# Patient Record
Sex: MALE | Race: WHITE | NOT HISPANIC OR LATINO | Employment: OTHER | URBAN - METROPOLITAN AREA
[De-identification: names, ages, dates, MRNs, and addresses within clinical notes are randomized per-mention and may not be internally consistent; named-entity substitution may affect disease eponyms.]

---

## 2018-02-12 ENCOUNTER — PREP FOR SURGERY (OUTPATIENT)
Dept: OTHER | Facility: HOSPITAL | Age: 78
End: 2018-02-12

## 2018-02-12 DIAGNOSIS — Z86.010 HX OF ADENOMATOUS COLONIC POLYPS: Primary | ICD-10-CM

## 2018-02-15 PROBLEM — Z86.010 HX OF ADENOMATOUS COLONIC POLYPS: Status: ACTIVE | Noted: 2018-02-15

## 2018-02-26 ENCOUNTER — OUTSIDE FACILITY SERVICE (OUTPATIENT)
Dept: GASTROENTEROLOGY | Facility: CLINIC | Age: 78
End: 2018-02-26

## 2018-02-26 PROCEDURE — 45385 COLONOSCOPY W/LESION REMOVAL: CPT | Performed by: INTERNAL MEDICINE

## 2018-02-26 PROCEDURE — 45381 COLONOSCOPY SUBMUCOUS NJX: CPT | Performed by: INTERNAL MEDICINE

## 2018-03-08 ENCOUNTER — TELEPHONE (OUTPATIENT)
Dept: GASTROENTEROLOGY | Facility: CLINIC | Age: 78
End: 2018-03-08

## 2018-03-08 NOTE — TELEPHONE ENCOUNTER
----- Message from Rob POSADA MD sent at 3/8/2018 12:15 PM EST -----  Regarding: Biopsy results  I called results and left on his answering machine, would try to reach him in the event he has any questions regarding this.  Essentially told him we would talk about follow-up examination in 3 years.  ----- Message -----     From: Interface, Scans Incoming     Sent: 3/7/2018  11:40 AM       To: Rob POSADA MD

## 2018-03-16 NOTE — TELEPHONE ENCOUNTER
Called pt and pt reports that he did speak with Dr Call and he did understand his results.  Advised pt we have have put his c/s in the reminder system for 02/26/2021.  Pt verb understanding.

## 2019-06-11 PROBLEM — E78.5 HYPERLIPIDEMIA LDL GOAL <100: Status: ACTIVE | Noted: 2019-06-11

## 2019-06-11 PROBLEM — Z92.89 H/O ECHOCARDIOGRAM: Status: ACTIVE | Noted: 2019-06-11

## 2019-06-11 PROBLEM — E11.9 DIABETES MELLITUS (HCC): Status: ACTIVE | Noted: 2019-06-11

## 2019-06-11 PROBLEM — Z92.89 HISTORY OF DIAGNOSTIC ULTRASOUND: Status: ACTIVE | Noted: 2019-06-11

## 2019-06-11 PROBLEM — Z95.1 S/P CABG (CORONARY ARTERY BYPASS GRAFT): Status: ACTIVE | Noted: 2019-06-11

## 2019-06-11 PROBLEM — I25.10 CAD (CORONARY ARTERY DISEASE): Status: ACTIVE | Noted: 2019-06-11

## 2019-06-11 PROBLEM — Z95.5 H/O HEART ARTERY STENT: Status: ACTIVE | Noted: 2019-06-11

## 2019-06-11 PROBLEM — Z98.890 HISTORY OF CARDIAC CATH: Status: ACTIVE | Noted: 2019-06-11

## 2019-06-11 PROBLEM — I10 ESSENTIAL HYPERTENSION: Status: ACTIVE | Noted: 2019-06-11

## 2019-06-12 ENCOUNTER — OFFICE VISIT (OUTPATIENT)
Dept: CARDIOLOGY | Facility: CLINIC | Age: 79
End: 2019-06-12

## 2019-06-12 VITALS
BODY MASS INDEX: 28.04 KG/M2 | HEART RATE: 60 BPM | HEIGHT: 72 IN | SYSTOLIC BLOOD PRESSURE: 110 MMHG | DIASTOLIC BLOOD PRESSURE: 60 MMHG | WEIGHT: 207 LBS

## 2019-06-12 DIAGNOSIS — Z95.1 S/P CABG (CORONARY ARTERY BYPASS GRAFT): ICD-10-CM

## 2019-06-12 DIAGNOSIS — I25.10 CORONARY ARTERY DISEASE INVOLVING NATIVE CORONARY ARTERY OF NATIVE HEART WITHOUT ANGINA PECTORIS: Primary | ICD-10-CM

## 2019-06-12 DIAGNOSIS — I10 ESSENTIAL HYPERTENSION: ICD-10-CM

## 2019-06-12 DIAGNOSIS — Z95.5 H/O HEART ARTERY STENT: ICD-10-CM

## 2019-06-12 DIAGNOSIS — E10.9 TYPE 1 DIABETES MELLITUS WITHOUT COMPLICATION (HCC): ICD-10-CM

## 2019-06-12 DIAGNOSIS — E78.5 HYPERLIPIDEMIA LDL GOAL <100: ICD-10-CM

## 2019-06-12 PROCEDURE — 99214 OFFICE O/P EST MOD 30 MIN: CPT | Performed by: INTERNAL MEDICINE

## 2019-06-12 NOTE — PROGRESS NOTES
[facility]        Subjective:     Encounter Date:06/12/2019      Patient ID: Izabela Martinez is a 78 y.o. male.      HPI: I saw Izabela Martinez today for continued cardiac care of his cardiovascular disease.  He is a pleasant and active 78-year-old male.  He has a long history of coronary heart disease with a initial coronary artery bypass surgery 1997 and redo coronary artery bypass surgery 4/18/2017 with a saphenous vein graft to the LAD, PDA branch of the right coronary artery, and a sequential graft to OM 1 and OM 2 branchs of the circumflex.  While visiting in [place] August 25, 2018 he developed symptoms of angina with elevated troponin to 1.9.  Coronary angiography revealed thrombus in an old saphenous vein graft to the right coronary artery which patient demonstrated patent stent but there was ulcerated plaque with thrombus in the distal region.  He demonstrated patent grafts to the LAD, diagonal sequential graft to OM1 OM 2 and to the right coronary artery he was placed on anticoagulation with Xarelto in addition to aspirin 81 mg daily.  He remains free of angina.  He does not experience any progressive dyspnea on exertion and is without orthopnea or PND no lower extremity edema.  He denies syncope near syncope or palpitations.  His blood pressure is well controlled.  He remains on atorvastatin 20 mg a day and his lipids are monitored by Dr. Ze viveros.      The following portions of the patient's history were reviewed and updated as appropriate: allergies, current medications, past family history, past medical history, past social history, past surgical history and problem list.    Problem List:  Patient Active Problem List   Diagnosis   • Hx of adenomatous colonic polyps   • Diabetes mellitus (CMS/[facility])   • Hyperlipidemia LDL goal <100   • Essential hypertension   • CAD (coronary artery disease)   • S/P CABG (coronary artery bypass graft)   • H/O heart artery stent   • History of  cardiac cath   • H/O echocardiogram   • History of diagnostic ultrasound       Past Medical History:  Past Medical History:   Diagnosis Date   • CAD (coronary artery disease) 6/11/2019   • Diabetes mellitus (CMS/[facility]) 6/11/2019   • Essential hypertension 6/11/2019   • H/O echocardiogram 6/11/2019    3/21/14 - Mild concentric LVG.  EF 55-60%.  Mild MR.  Moderate [place].     • H/O heart artery stent 6/11/2019    3/20/14 - PCI to the SVG of the RCA with a RESOLUTE drug eluting stent   • History of cardiac cath 6/11/2019 4/17/17 - Severe three vessel obstructive CAD.  Severely diseased vein grafts (SVG to ?OM, and SVG to RCA) that are the source of the patient's unstable angina..  EF 65%.   • History of diagnostic ultrasound 6/11/2019 4/18/17 - CAROTID ULTRASOUND - No evidence of significant carotid stenosis.  NASCET criteria was utilized.   • Hyperlipidemia LDL goal <100 6/11/2019   • S/P CABG (coronary artery bypass graft) 6/11/2019    Redo CABG 4/2017       Past Surgical History:  No past surgical history on file.    Social History:  Social History     Socioeconomic History   • Marital status:      Spouse name: Not on file   • Number of children: Not on file   • Years of education: Not on file   • Highest education level: Not on file       Allergies:  Allergies   Allergen Reactions   • Demerol [Meperidine] Dizziness   • Toprol Xl [Metoprolol Tartrate] Unknown (See Comments)     unknown         ROS:  Review of Systems   Constitution: Negative for weakness and malaise/fatigue.   HENT: Negative for hearing loss and nosebleeds.    Eyes: Negative for double vision and visual disturbance.   Cardiovascular: Negative for chest pain, claudication, dyspnea on exertion, near-syncope, orthopnea, palpitations, paroxysmal nocturnal dyspnea and syncope.   Respiratory: Negative for cough, shortness of breath, sleep disturbances due to breathing, snoring, sputum production and wheezing.    Endocrine: Negative for cold  "intolerance, heat intolerance, polydipsia and polyuria.   Hematologic/Lymphatic: Negative for adenopathy and bleeding problem. Does not bruise/bleed easily.   Skin: Negative for flushing and itching.   Musculoskeletal: Negative for back pain, falls, joint pain, joint swelling, muscle weakness and neck pain.   Gastrointestinal: Negative for abdominal pain, dysphagia, heartburn, nausea and vomiting.   Genitourinary: Negative for dysuria and frequency.   Neurological: Negative for disturbances in coordination, dizziness, light-headedness, loss of balance and numbness.   Psychiatric/Behavioral: Negative for altered mental status and memory loss. The patient is not nervous/anxious.    Allergic/Immunologic: Negative for hives and persistent infections.          Objective:         /60   Pulse 60   Ht 182.9 cm (72\")   Wt 93.9 kg (207 lb)   BMI 28.07 kg/m²     Physical Exam   Cardiovascular: Exam reveals gallop and S4.       In-Office Procedure(s):  Procedures    ASCVD RIsk Score::  The ASCVD Risk score (Juan SIMPSON Jr., et al., 2013) failed to calculate for the following reasons:    Cannot find a previous HDL lab    Cannot find a previous total cholesterol lab    The smoking status is invalid        Assessment/Plan:         1. Coronary artery disease involving native coronary artery of native heart without angina pectoris  Stable free of angina on current medical regimen    2. S/P CABG (coronary artery bypass graft)  Stable    3. H/O heart artery stent  Stable    4. Essential hypertension  Well-controlled on current medical regimen    5. Hyperlipidemia LDL goal <100  On statin therapy and well-controlled no significant side effects    6. Type 1 diabetes mellitus without complication (CMS/HCC)  Controlled with diet activity level and oral agent I feel Mr. Martinez is stable from the cardiovascular standpoint.  He is vigilant in regards to his risk modification and following his medical regimen.  I will see him in " follow-up in 6 months sooner if needed.           Yosef Rose MD  06/12/19  .

## 2019-10-14 RX ORDER — RAMIPRIL 2.5 MG/1
2.5 CAPSULE ORAL DAILY
Qty: 90 CAPSULE | Refills: 1 | Status: SHIPPED | OUTPATIENT
Start: 2019-10-14 | End: 2020-06-03

## 2019-10-14 RX ORDER — ATORVASTATIN CALCIUM 20 MG/1
20 TABLET, FILM COATED ORAL DAILY
Qty: 90 TABLET | Refills: 1 | Status: SHIPPED | OUTPATIENT
Start: 2019-10-14 | End: 2020-04-14 | Stop reason: SDUPTHER

## 2019-10-16 ENCOUNTER — TELEPHONE (OUTPATIENT)
Dept: CARDIOLOGY | Facility: CLINIC | Age: 79
End: 2019-10-16

## 2019-10-16 NOTE — TELEPHONE ENCOUNTER
Mr. Martinez wife called stating that he needs a refill on his Gemfibrozil 600mg tablets. He takes 2 per day, and gets 90 day refills. She states that [facility] has been trying to

## 2019-10-21 DIAGNOSIS — E78.5 HYPERLIPIDEMIA LDL GOAL <100: Primary | ICD-10-CM

## 2019-10-21 RX ORDER — GEMFIBROZIL 600 MG/1
600 TABLET, FILM COATED ORAL
Qty: 180 TABLET | Refills: 1 | Status: SHIPPED | OUTPATIENT
Start: 2019-10-21 | End: 2020-04-14 | Stop reason: SDUPTHER

## 2019-10-22 ENCOUNTER — TELEPHONE (OUTPATIENT)
Dept: CARDIOLOGY | Facility: CLINIC | Age: 79
End: 2019-10-22

## 2019-10-22 NOTE — TELEPHONE ENCOUNTER
Called back and spoke with Nir. He did not see ay reason for the phone call. I sent the rx yesterday and they received it. Ayala

## 2019-10-22 NOTE — TELEPHONE ENCOUNTER
Jil with [facility] is requesting a call regarding the patients medication. Patient needing a refill on Gemfibrozil 600 mg 1 tab 2 x's daily. 90 day supply      Jil [phone]  Ref # [id number]

## 2019-11-05 ENCOUNTER — OFFICE VISIT (OUTPATIENT)
Dept: FAMILY MEDICINE CLINIC | Facility: CLINIC | Age: 79
End: 2019-11-05

## 2019-11-05 VITALS
SYSTOLIC BLOOD PRESSURE: 124 MMHG | WEIGHT: 211 LBS | HEART RATE: 65 BPM | OXYGEN SATURATION: 96 % | TEMPERATURE: 97.4 F | HEIGHT: 72 IN | BODY MASS INDEX: 28.58 KG/M2 | DIASTOLIC BLOOD PRESSURE: 60 MMHG

## 2019-11-05 DIAGNOSIS — E66.3 OVERWEIGHT (BMI 25.0-29.9): ICD-10-CM

## 2019-11-05 DIAGNOSIS — E78.1 ESSENTIAL HYPERTRIGLYCERIDEMIA: ICD-10-CM

## 2019-11-05 DIAGNOSIS — D69.6 THROMBOCYTOPENIA (HCC): ICD-10-CM

## 2019-11-05 DIAGNOSIS — H91.93 HEARING DECREASED, BILATERAL: ICD-10-CM

## 2019-11-05 DIAGNOSIS — I10 ESSENTIAL HYPERTENSION: Primary | ICD-10-CM

## 2019-11-05 DIAGNOSIS — E78.5 HYPERLIPIDEMIA LDL GOAL <100: ICD-10-CM

## 2019-11-05 DIAGNOSIS — E11.9 TYPE 2 DIABETES MELLITUS WITHOUT COMPLICATION, WITHOUT LONG-TERM CURRENT USE OF INSULIN (HCC): ICD-10-CM

## 2019-11-05 DIAGNOSIS — R25.1 TREMOR: ICD-10-CM

## 2019-11-05 DIAGNOSIS — D64.9 ANEMIA, UNSPECIFIED TYPE: ICD-10-CM

## 2019-11-05 PROBLEM — S90.822A BLISTER OF FOOT, LEFT: Status: RESOLVED | Noted: 2019-11-05 | Resolved: 2019-11-05

## 2019-11-05 PROCEDURE — 99214 OFFICE O/P EST MOD 30 MIN: CPT | Performed by: INTERNAL MEDICINE

## 2019-11-05 RX ORDER — SILDENAFIL 100 MG/1
100 TABLET, FILM COATED ORAL
COMMUNITY
Start: 2012-05-02 | End: 2020-06-03

## 2019-11-05 NOTE — PATIENT INSTRUCTIONS

## 2019-11-05 NOTE — PROGRESS NOTES
Subjective   Izabela Martinez is a 79 y.o. male.     Chief Complaint   Patient presents with   • Hypertension       History of Present Illness   Here for follow-up of diabetes.  Patient states to have been compliant with medications.  Blood sugar monitoring - patient states has not been checked.  No episodes of hypoglycemia, nausea, vomiting, new rashes, syncope or other issues.  Denies any difficulties and not on any medications.  Follow-up for cholesterol.  Currently has been feeling well without any myalgias, muscle aches, weakness, numbness, chest pain, short of breath or other issues.  Currently is adherent with medication regimen and denies medication side effects. Is due for lab follow-up.  Follow-up for hypertension.  Currently has been feeling well and asymptomatic without any headaches,vision changes, cough, chest pain, shortness of breath, swelling, focal neurologic deficit, memory loss or syncope.  Has been taking the medications regularly and adherent with the regimen, Denies medication side effects and no significant interval events.  Home blood pressure has been running good.    Hearing issues.  Has been around car racing all of his life.    Has history of JAYCOB but failed treatment and refuses reevaluation.  At night moves a lot, jerks a lot and acts out dreams at night.  Has tremor in the right greater than left hand present for several years and worsening.  States has been present since last heart surgery.  Seems to be some worsening and is noticeable with writing and raising glass to mouth.  Seems to be worse when he is physically tired or after manual working.    The following portions of the patient's history were reviewed and updated as appropriate: allergies, current medications, past family history, past medical history, past social history, past surgical history and problem list.    Past Medical History:   Diagnosis Date   • Allergic cough    • Anemia    • Arteriosclerosis of coronary artery     • Benign enlargement of prostate    • Benign essential hypertension    • Blister of foot, left    • BMI 30.0-30.9,adult    • CAD (coronary artery disease) 6/11/2019   • Cough    • Diabetes mellitus (CMS/HCC) 6/11/2019   • Disorder of skin    • Essential hypertension 6/11/2019    Benign   • Essential hypertriglyceridemia    • H/O echocardiogram 6/11/2019    3/21/14 - Mild concentric LVG.  EF 55-60%.  Mild MR.  Moderate [place].     • H/O heart artery stent 6/11/2019    3/20/14 - PCI to the SVG of the RCA with a RESOLUTE drug eluting stent   • Hammer toe     right second-fourth toes    • History of cancer    • History of cardiac cath 6/11/2019 4/17/17 - Severe three vessel obstructive CAD.  Severely diseased vein grafts (SVG to ?OM, and SVG to RCA) that are the source of the patient's unstable angina..  EF 65%.   • History of diagnostic ultrasound 6/11/2019 4/18/17 - CAROTID ULTRASOUND - No evidence of significant carotid stenosis.  NASCET criteria was utilized.   • History of echocardiogram    • History of nuclear stress test    • Hyperlipidemia LDL goal <100 6/11/2019   • Hypertrophy of prostate     Benign   • Hypocalcemia    • Hypokalemia    • Inability to attain erection    • Medicare annual wellness visit, subsequent    • JAYCOB on CPAP    • Right retinal detachment    • S/P CABG (coronary artery bypass graft) 6/11/2019    Redo CABG 4/2017   • S/P cardiac catheterization    • S/P coronary artery stent placement    • Thrombocytopenia (CMS/HCC)        Past Surgical History:   Procedure Laterality Date   • APPENDECTOMY     • CATARACT EXTRACTION  2012 2011 and 2012   • COLONOSCOPY      polyps- recheck 2014. polyps repeat every 3 years   • CORONARY ARTERY BYPASS GRAFT      4 vessel 4/21/17. 4 vessel- Left internal mammary to LAD, saphenous vein graft to diagonal branch, marginal brance and the PDA branch.    • FOOT SURGERY      right heel   • OTHER SURGICAL HISTORY      Cath stent placement    • RETINAL DETACHMENT  REPAIR         Family History   Problem Relation Age of Onset   • Cancer Mother    • Diabetes Brother    • Heart disease Brother    • Heart disease Maternal Grandmother    • Heart disease Paternal Grandfather        Social History     Socioeconomic History   • Marital status:      Spouse name: Not on file   • Number of children: Not on file   • Years of education: Not on file   • Highest education level: Not on file   Tobacco Use   • Smoking status: Never Smoker   • Smokeless tobacco: Never Used   Substance and Sexual Activity   • Alcohol use: No     Frequency: Never   • Drug use: No   • Sexual activity: Defer       Review of Systems   Constitutional: Negative for activity change, appetite change, fatigue, fever, unexpected weight gain and unexpected weight loss.   HENT: Positive for hearing loss. Negative for nosebleeds, rhinorrhea, trouble swallowing and voice change.    Eyes: Negative for visual disturbance.   Respiratory: Negative for cough, chest tightness, shortness of breath and wheezing.    Cardiovascular: Negative for chest pain, palpitations and leg swelling.   Gastrointestinal: Negative for abdominal pain, blood in stool, constipation, diarrhea, nausea, vomiting, GERD and indigestion.   Genitourinary: Negative for dysuria, frequency and hematuria.   Musculoskeletal: Negative for arthralgias, back pain and myalgias.        Hammer toes and right ankle chronic pain   Skin: Negative for rash and bruise.   Neurological: Positive for tremors. Negative for dizziness, weakness, light-headedness, numbness, headache and memory problem.   Hematological: Negative for adenopathy. Does not bruise/bleed easily.   Psychiatric/Behavioral: Negative for sleep disturbance and depressed mood. The patient is not nervous/anxious.        Objective   Vitals:    11/05/19 0831   BP: 124/60   Pulse: 65   Temp: 97.4 °F (36.3 °C)   SpO2: 96%     Body mass index is 28.62 kg/m².  Physical Exam   Constitutional: He is oriented to  person, place, and time. He appears well-developed and well-nourished. No distress.   HENT:   Head: Normocephalic and atraumatic.   Right Ear: External ear normal.   Left Ear: External ear normal.   Nose: Nose normal.   Mouth/Throat: Oropharynx is clear and moist.   Hearing loss bilateral on exam   Eyes: Conjunctivae and EOM are normal. Pupils are equal, round, and reactive to light.   Neck: Normal range of motion. Neck supple. No tracheal deviation present. No thyromegaly present.   Cardiovascular: Normal rate, regular rhythm, normal heart sounds and intact distal pulses. Exam reveals no gallop and no friction rub.   No murmur heard.  Pulmonary/Chest: Effort normal and breath sounds normal. No respiratory distress.   Abdominal: Soft. Bowel sounds are normal. He exhibits no mass. There is no tenderness. There is no guarding.   Musculoskeletal: Normal range of motion. He exhibits no edema.   Right hammer toes   Lymphadenopathy:     He has no cervical adenopathy.   Neurological: He is alert and oriented to person, place, and time. He displays normal reflexes. He exhibits normal muscle tone.   Mild tremor in right greater than left had worse with intention and not at rest.   Skin: Skin is warm and dry. Capillary refill takes less than 2 seconds. No rash noted. He is not diaphoretic.   Psychiatric: He has a normal mood and affect. His behavior is normal. Judgment and thought content normal.   Nursing note and vitals reviewed.      No results found for this or any previous visit (from the past 2016 hour(s)).  Assessment/Plan   Izabela was seen today for hypertension.    Diagnoses and all orders for this visit:    Essential hypertension  -     Comprehensive Metabolic Panel  -     Lipid Panel    Hyperlipidemia LDL goal <100  -     Comprehensive Metabolic Panel  -     Lipid Panel    Type 2 diabetes mellitus without complication, without long-term current use of insulin (CMS/[facility])  -     Comprehensive Metabolic Panel  -      Lipid Panel  -     Hemoglobin A1c  -     Microalbumin / Creatinine Urine Ratio - Urine, Clean Catch    Anemia, unspecified type  -     CBC & Differential    Thrombocytopenia (CMS/HCC)  -     CBC & Differential    Essential hypertriglyceridemia  -     Comprehensive Metabolic Panel  -     Lipid Panel    Tremor    Hearing decreased, bilateral  -     Ambulatory Referral to Audiology    Overweight (BMI 25.0-29.9)    Continue the current medications.  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  Check the labs as ordered.  Not sure what his blood sugars have been doing so will await the labs and encouraged to follow the diet and exercise as best as possible.  Schedule follow up with audiology for the hearing loss evident.  Follow up with cardiology as scheduled.

## 2019-11-06 LAB
ALBUMIN SERPL-MCNC: 4.7 G/DL (ref 3.5–5.2)
ALBUMIN/CREAT UR: 9.9 MG/G CREAT (ref 0–30)
ALBUMIN/GLOB SERPL: 2.5 G/DL
ALP SERPL-CCNC: 96 U/L (ref 39–117)
ALT SERPL-CCNC: 15 U/L (ref 1–41)
AST SERPL-CCNC: 18 U/L (ref 1–40)
BASOPHILS # BLD AUTO: 0.03 10*3/MM3 (ref 0–0.2)
BASOPHILS NFR BLD AUTO: 0.6 % (ref 0–1.5)
BILIRUB SERPL-MCNC: 0.5 MG/DL (ref 0.2–1.2)
BUN SERPL-MCNC: 24 MG/DL (ref 8–23)
BUN/CREAT SERPL: 25.5 (ref 7–25)
CALCIUM SERPL-MCNC: 9.3 MG/DL (ref 8.6–10.5)
CHLORIDE SERPL-SCNC: 104 MMOL/L (ref 98–107)
CHOLEST SERPL-MCNC: 127 MG/DL (ref 0–200)
CO2 SERPL-SCNC: 25.7 MMOL/L (ref 22–29)
CREAT SERPL-MCNC: 0.94 MG/DL (ref 0.76–1.27)
CREAT UR-MCNC: 114.3 MG/DL
EOSINOPHIL # BLD AUTO: 0.22 10*3/MM3 (ref 0–0.4)
EOSINOPHIL NFR BLD AUTO: 4.1 % (ref 0.3–6.2)
ERYTHROCYTE [DISTWIDTH] IN BLOOD BY AUTOMATED COUNT: 11.9 % (ref 12.3–15.4)
GLOBULIN SER CALC-MCNC: 1.9 GM/DL
GLUCOSE SERPL-MCNC: 119 MG/DL (ref 65–99)
HBA1C MFR BLD: 7.2 % (ref 4.8–5.6)
HCT VFR BLD AUTO: 44 % (ref 37.5–51)
HDLC SERPL-MCNC: 39 MG/DL (ref 40–60)
HGB BLD-MCNC: 15.2 G/DL (ref 13–17.7)
IMM GRANULOCYTES # BLD AUTO: 0.02 10*3/MM3 (ref 0–0.05)
IMM GRANULOCYTES NFR BLD AUTO: 0.4 % (ref 0–0.5)
LDLC SERPL CALC-MCNC: 71 MG/DL (ref 0–100)
LYMPHOCYTES # BLD AUTO: 2.27 10*3/MM3 (ref 0.7–3.1)
LYMPHOCYTES NFR BLD AUTO: 41.9 % (ref 19.6–45.3)
MCH RBC QN AUTO: 33 PG (ref 26.6–33)
MCHC RBC AUTO-ENTMCNC: 34.5 G/DL (ref 31.5–35.7)
MCV RBC AUTO: 95.7 FL (ref 79–97)
MICROALBUMIN UR-MCNC: 11.3 UG/ML
MONOCYTES # BLD AUTO: 0.58 10*3/MM3 (ref 0.1–0.9)
MONOCYTES NFR BLD AUTO: 10.7 % (ref 5–12)
NEUTROPHILS # BLD AUTO: 2.3 10*3/MM3 (ref 1.7–7)
NEUTROPHILS NFR BLD AUTO: 42.3 % (ref 42.7–76)
NRBC BLD AUTO-RTO: 0 /100 WBC (ref 0–0.2)
PLATELET # BLD AUTO: 197 10*3/MM3 (ref 140–450)
POTASSIUM SERPL-SCNC: 4.5 MMOL/L (ref 3.5–5.2)
PROT SERPL-MCNC: 6.6 G/DL (ref 6–8.5)
RBC # BLD AUTO: 4.6 10*6/MM3 (ref 4.14–5.8)
SODIUM SERPL-SCNC: 142 MMOL/L (ref 136–145)
TRIGL SERPL-MCNC: 86 MG/DL (ref 0–150)
VLDLC SERPL CALC-MCNC: 17.2 MG/DL
WBC # BLD AUTO: 5.42 10*3/MM3 (ref 3.4–10.8)

## 2019-11-20 ENCOUNTER — OFFICE VISIT (OUTPATIENT)
Dept: CARDIOLOGY | Facility: CLINIC | Age: 79
End: 2019-11-20

## 2019-11-20 VITALS
BODY MASS INDEX: 28.71 KG/M2 | HEART RATE: 68 BPM | SYSTOLIC BLOOD PRESSURE: 100 MMHG | DIASTOLIC BLOOD PRESSURE: 66 MMHG | WEIGHT: 212 LBS | HEIGHT: 72 IN

## 2019-11-20 DIAGNOSIS — I10 ESSENTIAL HYPERTENSION: ICD-10-CM

## 2019-11-20 DIAGNOSIS — Z95.1 S/P CABG (CORONARY ARTERY BYPASS GRAFT): ICD-10-CM

## 2019-11-20 DIAGNOSIS — I25.10 CORONARY ARTERY DISEASE INVOLVING NATIVE CORONARY ARTERY OF NATIVE HEART WITHOUT ANGINA PECTORIS: ICD-10-CM

## 2019-11-20 DIAGNOSIS — E78.1 ESSENTIAL HYPERTRIGLYCERIDEMIA: ICD-10-CM

## 2019-11-20 DIAGNOSIS — E11.9 TYPE 2 DIABETES MELLITUS WITHOUT COMPLICATION, WITHOUT LONG-TERM CURRENT USE OF INSULIN (HCC): ICD-10-CM

## 2019-11-20 DIAGNOSIS — E78.5 HYPERLIPIDEMIA LDL GOAL <100: ICD-10-CM

## 2019-11-20 DIAGNOSIS — Z95.5 H/O HEART ARTERY STENT: ICD-10-CM

## 2019-11-20 DIAGNOSIS — R42 ORTHOSTATIC DIZZINESS: Primary | ICD-10-CM

## 2019-11-20 PROCEDURE — 99214 OFFICE O/P EST MOD 30 MIN: CPT | Performed by: INTERNAL MEDICINE

## 2019-11-20 NOTE — PROGRESS NOTES
[facility]        Subjective:     Encounter Date:11/20/2019      Patient ID: Izabela Martinez is a 79 y.o. male.      HPI: I saw Izabela Martinez today for cardiovascular care.  Mr. Martinez is a pleasant and active 79-year-old.  He reports some mild orthostatic dizziness.  He has known coronary artery disease and underwent redo coronary artery bypass surgery in 2017 with a saphenous vein graft to the LAD, PDA, sequential graft OM1 and OM 2 of the circumflex.  August 25, 2018 he developed symptoms of angina and was hospitalized while visiting in [place].  His opponent elevated 1.9.  He underwent coronary angiography in his redo bypass grafts were patent.  It was noted that old saphenous vein graft to the right coronary artery demonstrated ulcerated plaque and thrombus.  Previously placed stent in this graft was patent.  Anticoagulation with Xarelto was added to his medical regimen and remains stable.    Mr. Martinez has baseline dyspnea on exertion but this is not significantly changed.  He is free of orthopnea or PND.  He denies gladis syncope or palpitations.    Fasting lipids obtained 11/5/2019 revealed triglycerides 86, HDL 39, LDL 71.      The following portions of the patient's history were reviewed and updated as appropriate: allergies, current medications, past family history, past medical history, past social history, past surgical history and problem list.    Problem List:  Patient Active Problem List   Diagnosis   • Hx of adenomatous colonic polyps   • Diabetes mellitus (CMS/HCC)   • Hyperlipidemia LDL goal <100   • Essential hypertension   • CAD (coronary artery disease)   • S/P CABG (coronary artery bypass graft)   • H/O heart artery stent   • History of cardiac cath   • H/O echocardiogram   • History of diagnostic ultrasound   • Family history of early CAD   • Erectile dysfunction   • Anemia   • Thrombocytopenia (CMS/HCC)   • Essential hypertriglyceridemia   • Tremor   • Hearing decreased,  bilateral   • Orthostatic dizziness       Past Medical History:  Past Medical History:   Diagnosis Date   • Allergic cough    • Anemia    • Arteriosclerosis of coronary artery    • Benign enlargement of prostate    • Benign essential hypertension    • Blister of foot, left    • BMI 30.0-30.9,adult    • CAD (coronary artery disease) 6/11/2019   • Cough    • Diabetes mellitus (CMS/HCC) 6/11/2019   • Disorder of skin    • Essential hypertension 6/11/2019    Benign   • Essential hypertriglyceridemia    • H/O echocardiogram 6/11/2019    3/21/14 - Mild concentric LVG.  EF 55-60%.  Mild MR.  Moderate CO.     • H/O heart artery stent 6/11/2019    3/20/14 - PCI to the SVG of the RCA with a RESOLUTE drug eluting stent   • Hammer toe     right second-fourth toes    • History of cancer    • History of cardiac cath 6/11/2019 4/17/17 - Severe three vessel obstructive CAD.  Severely diseased vein grafts (SVG to ?OM, and SVG to RCA) that are the source of the patient's unstable angina..  EF 65%.   • History of diagnostic ultrasound 6/11/2019 4/18/17 - CAROTID ULTRASOUND - No evidence of significant carotid stenosis.  NASCET criteria was utilized.   • History of echocardiogram    • History of nuclear stress test    • Hyperlipidemia LDL goal <100 6/11/2019   • Hypertrophy of prostate     Benign   • Hypocalcemia    • Hypokalemia    • Inability to attain erection    • Medicare annual wellness visit, subsequent    • JAYCOB on CPAP    • Right retinal detachment    • S/P CABG (coronary artery bypass graft) 6/11/2019    Redo CABG 4/2017   • S/P cardiac catheterization    • S/P coronary artery stent placement    • Thrombocytopenia (CMS/HCC)        Past Surgical History:  Past Surgical History:   Procedure Laterality Date   • APPENDECTOMY     • CATARACT EXTRACTION  2012 2011 and 2012   • COLONOSCOPY      polyps- recheck 2014. polyps repeat every 3 years   • CORONARY ARTERY BYPASS GRAFT      4 vessel 4/21/17. 4 vessel- Left internal  mammary to LAD, saphenous vein graft to diagonal branch, marginal brance and the PDA branch.    • FOOT SURGERY      right heel   • OTHER SURGICAL HISTORY      Cath stent placement    • RETINAL DETACHMENT REPAIR         Social History:  Social History     Socioeconomic History   • Marital status:      Spouse name: Not on file   • Number of children: Not on file   • Years of education: Not on file   • Highest education level: Not on file   Tobacco Use   • Smoking status: Never Smoker   • Smokeless tobacco: Never Used   Substance and Sexual Activity   • Alcohol use: No     Frequency: Never   • Drug use: No   • Sexual activity: Defer       Allergies:  Allergies   Allergen Reactions   • Demerol [Meperidine] Dizziness         ROS:  Review of Systems   Constitution: Negative for weakness and malaise/fatigue.   HENT: Negative for hearing loss and nosebleeds.    Eyes: Negative for double vision and visual disturbance.   Cardiovascular: Negative for chest pain, claudication, dyspnea on exertion, near-syncope, orthopnea, palpitations, paroxysmal nocturnal dyspnea and syncope.   Respiratory: Negative for cough, shortness of breath, sleep disturbances due to breathing, snoring, sputum production and wheezing.    Endocrine: Negative for cold intolerance, heat intolerance, polydipsia and polyuria.   Hematologic/Lymphatic: Negative for adenopathy and bleeding problem. Does not bruise/bleed easily.   Skin: Negative for flushing and itching.   Musculoskeletal: Negative for back pain, falls, joint pain, joint swelling, muscle weakness and neck pain.   Gastrointestinal: Negative for abdominal pain, dysphagia, heartburn, nausea and vomiting.   Genitourinary: Negative for dysuria and frequency.   Neurological: Positive for dizziness. Negative for disturbances in coordination, light-headedness, loss of balance and numbness.   Psychiatric/Behavioral: Negative for altered mental status and memory loss. The patient is not  "nervous/anxious.    Allergic/Immunologic: Negative for hives and persistent infections.          Objective:         /66   Pulse 68   Ht 182.9 cm (72\")   Wt 96.2 kg (212 lb)   BMI 28.75 kg/m²     Physical Exam   Constitutional: He is oriented to person, place, and time. He appears well-developed and well-nourished.   HENT:   Head: Normocephalic and atraumatic.   Eyes: Conjunctivae and EOM are normal. Pupils are equal, round, and reactive to light.   Neck: Neck supple. No thyromegaly present.   Cardiovascular: Normal rate, regular rhythm, S1 normal, S2 normal and intact distal pulses. PMI is not displaced. Exam reveals gallop and S4. Exam reveals no S3, no distant heart sounds, no friction rub, no midsystolic click and no opening snap.   No murmur heard.  Pulses:       Carotid pulses are 2+ on the right side, and 2+ on the left side.       Radial pulses are 2+ on the right side, and 2+ on the left side.        Femoral pulses are 2+ on the right side, and 2+ on the left side.       Dorsalis pedis pulses are 2+ on the right side, and 2+ on the left side.        Posterior tibial pulses are 2+ on the right side, and 2+ on the left side.   Pulmonary/Chest: Effort normal and breath sounds normal. No respiratory distress. He has no wheezes. He has no rales.   Abdominal: Soft. Bowel sounds are normal. He exhibits no distension and no mass. There is no tenderness.   Musculoskeletal: Normal range of motion. He exhibits no edema.   Neurological: He is alert and oriented to person, place, and time.   Skin: Skin is warm and dry. No erythema.   Psychiatric: He has a normal mood and affect.       In-Office Procedure(s):  Procedures    ASCVD RIsk Score::  The ASCVD Risk score ([place] TATIANA Jr., et al., 2013) failed to calculate for the following reasons:    The 2013 ASCVD risk score is only valid for ages 40 to 79        Assessment/Plan:         1. Orthostatic dizziness  Moderately symptomatic    2. Coronary artery disease " involving native coronary artery of native heart without angina pectoris  Stable    3. S/P CABG (coronary artery bypass graft)  Stable    4. H/O heart artery stent  Stable    5. Essential hypertriglyceridemia  Continue low-cholesterol low saturated fat diet  and gemfibrozil 600 mg daily    6. Essential hypertension  Relative hypotension    7. Hyperlipidemia LDL goal <100  Continue low-cholesterol low saturated fat diet and atorvastatin 20 mg daily    8. Type 2 diabetes mellitus without complication, without long-term current use of insulin (CMS/[facility])    Mr. Martinez is free of .  He does report orthostatic dizziness his blood pressure is marginal at 100/66.  We will discontinue ramipril.  Of encouraged him to liberalize his salt in his diet.  He question whether his Xarelto anticoagulation can be discontinued.  I will obtain a visual copy of his coronary angiogram while in [place] from August 25, 2018 for review.  He will continue Xarelto and aspirin           Yosef Rose MD  11/20/19  .

## 2019-11-21 RX ORDER — NITROGLYCERIN 0.4 MG/1
0.4 TABLET SUBLINGUAL
Qty: 25 TABLET | Refills: 3 | Status: SHIPPED | OUTPATIENT
Start: 2019-11-21 | End: 2021-05-05 | Stop reason: SDUPTHER

## 2020-04-14 ENCOUNTER — TELEPHONE (OUTPATIENT)
Dept: CARDIOLOGY | Facility: CLINIC | Age: 80
End: 2020-04-14

## 2020-04-14 DIAGNOSIS — E78.5 HYPERLIPIDEMIA LDL GOAL <100: ICD-10-CM

## 2020-04-14 RX ORDER — GEMFIBROZIL 600 MG/1
600 TABLET, FILM COATED ORAL
Qty: 180 TABLET | Refills: 1 | Status: SHIPPED | OUTPATIENT
Start: 2020-04-14 | End: 2020-06-03 | Stop reason: SDUPTHER

## 2020-04-14 RX ORDER — ATORVASTATIN CALCIUM 20 MG/1
20 TABLET, FILM COATED ORAL DAILY
Qty: 90 TABLET | Refills: 1 | Status: SHIPPED | OUTPATIENT
Start: 2020-04-14 | End: 2020-06-03 | Stop reason: SDUPTHER

## 2020-04-14 NOTE — TELEPHONE ENCOUNTER
----- Message from Tanner Aldana DO sent at 8/6/2019  7:06 PM CDT -----  Labs revealed negative hepatitis C screening, appropriate cholesterol levels. Initial thyroid screen is just a little bit out of range, but the actual amount of thyroid hormone in the body is normal.  Please let patient know.     Thank you!  Tanner Aldana DO       Done. [place]

## 2020-04-14 NOTE — TELEPHONE ENCOUNTER
DR Navas PT    PT has new prescription Ins needs new order sent to Mail order.    Gemfibrozil 600 mg BID for 90 days  Atorvastatin 20 mg 1 tab daily for 90 days.    Sent to [facility].

## 2020-04-15 DIAGNOSIS — E78.5 HYPERLIPIDEMIA LDL GOAL <100: ICD-10-CM

## 2020-06-03 ENCOUNTER — TELEMEDICINE (OUTPATIENT)
Dept: CARDIOLOGY | Facility: CLINIC | Age: 80
End: 2020-06-03

## 2020-06-03 VITALS
SYSTOLIC BLOOD PRESSURE: 136 MMHG | BODY MASS INDEX: 27.01 KG/M2 | WEIGHT: 199.4 LBS | DIASTOLIC BLOOD PRESSURE: 76 MMHG | HEIGHT: 72 IN

## 2020-06-03 DIAGNOSIS — I10 ESSENTIAL HYPERTENSION: ICD-10-CM

## 2020-06-03 DIAGNOSIS — E78.5 HYPERLIPIDEMIA LDL GOAL <100: ICD-10-CM

## 2020-06-03 DIAGNOSIS — I25.10 CORONARY ARTERY DISEASE INVOLVING NATIVE CORONARY ARTERY OF NATIVE HEART WITHOUT ANGINA PECTORIS: Primary | ICD-10-CM

## 2020-06-03 DIAGNOSIS — E11.9 TYPE 2 DIABETES MELLITUS WITHOUT COMPLICATION, WITHOUT LONG-TERM CURRENT USE OF INSULIN (HCC): ICD-10-CM

## 2020-06-03 DIAGNOSIS — Z95.1 S/P CABG (CORONARY ARTERY BYPASS GRAFT): ICD-10-CM

## 2020-06-03 PROCEDURE — 99214 OFFICE O/P EST MOD 30 MIN: CPT | Performed by: INTERNAL MEDICINE

## 2020-06-03 RX ORDER — ATORVASTATIN CALCIUM 20 MG/1
20 TABLET, FILM COATED ORAL DAILY
Qty: 90 TABLET | Refills: 3 | Status: SHIPPED | OUTPATIENT
Start: 2020-06-03 | End: 2020-10-07 | Stop reason: SDUPTHER

## 2020-06-03 RX ORDER — GEMFIBROZIL 600 MG/1
600 TABLET, FILM COATED ORAL
Qty: 180 TABLET | Refills: 3 | Status: SHIPPED | OUTPATIENT
Start: 2020-06-03 | End: 2020-10-07 | Stop reason: SDUPTHER

## 2020-06-03 NOTE — PROGRESS NOTES
[facility]    You have chosen to receive care through a telehealth visit.  Do you consent to use a video/audio connection for your medical care today? Yes    Subjective:     Encounter Date:06/03/2020      Patient ID: Izabela Martinez is a 79 y.o. male.      HPI: Zeenat Martinez agreed to a video visit secondary to the coronavirus pandemic.  He is a pleasant 79-year-old male who is observing the CDC guidelines for social distancing.  He denies any fever cough or shortness of breath.  He does not report any change in his sense of smell or taste.  Mr. Martinez has a history of coronary heart disease and is undergone redo coronary artery bypass surgery.  He remains active and free of angina.  He has his baseline dyspnea on exertion which is not progressive.  He is free of orthopnea or PND no lower extremity edema.  He previously reported orthostatic dizziness I discontinued his ramipril and his dizziness has resolved.  He denies any gladis syncope or palpitations.  He continues to tolerate his current medical regimen well including his anticoagulation.  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      The following portions of the patient's history were reviewed and updated as appropriate: allergies, current medications, past family history, past medical history, past social history, past surgical history and problem list.    Problem List:  Patient Active Problem List   Diagnosis   • Hx of adenomatous colonic polyps   • Diabetes mellitus (CMS/HCC)   • Hyperlipidemia LDL goal <100   • Essential hypertension   • CAD (coronary artery disease)   • S/P CABG (coronary artery bypass graft)   • H/O heart artery stent   • History of cardiac cath   • H/O echocardiogram   • History of diagnostic ultrasound   • Family history of early CAD   • Erectile dysfunction   • Anemia   • Thrombocytopenia (CMS/HCC)   • Essential hypertriglyceridemia   • Tremor   •  Hearing decreased, bilateral   • Orthostatic dizziness       Past Medical History:  Past Medical History:   Diagnosis Date   • Allergic cough    • Anemia    • Arteriosclerosis of coronary artery    • Benign enlargement of prostate    • Benign essential hypertension    • Blister of foot, left    • BMI 30.0-30.9,adult    • CAD (coronary artery disease) 6/11/2019   • Cough    • Diabetes mellitus (CMS/HCC) 6/11/2019   • Disorder of skin    • Essential hypertension 6/11/2019    Benign   • Essential hypertriglyceridemia    • H/O echocardiogram 6/11/2019    3/21/14 - Mild concentric LVG.  EF 55-60%.  Mild MR.  Moderate KS.     • H/O heart artery stent 6/11/2019    3/20/14 - PCI to the SVG of the RCA with a RESOLUTE drug eluting stent   • Hammer toe     right second-fourth toes    • History of cancer    • History of cardiac cath 6/11/2019 4/17/17 - Severe three vessel obstructive CAD.  Severely diseased vein grafts (SVG to ?OM, and SVG to RCA) that are the source of the patient's unstable angina..  EF 65%.   • History of diagnostic ultrasound 6/11/2019 4/18/17 - CAROTID ULTRASOUND - No evidence of significant carotid stenosis.  NASCET criteria was utilized.   • History of echocardiogram    • History of nuclear stress test    • Hyperlipidemia LDL goal <100 6/11/2019   • Hypertrophy of prostate     Benign   • Hypocalcemia    • Hypokalemia    • Inability to attain erection    • Medicare annual wellness visit, subsequent    • JAYCOB on CPAP    • Right retinal detachment    • S/P CABG (coronary artery bypass graft) 6/11/2019    Redo CABG 4/2017   • S/P cardiac catheterization    • S/P coronary artery stent placement    • Thrombocytopenia (CMS/HCC)        Past Surgical History:  Past Surgical History:   Procedure Laterality Date   • APPENDECTOMY     • CATARACT EXTRACTION  2012 2011 and 2012   • COLONOSCOPY      polyps- recheck 2014. polyps repeat every 3 years   • CORONARY ARTERY BYPASS GRAFT      4 vessel 4/21/17. 4  vessel- Left internal mammary to LAD, saphenous vein graft to diagonal branch, marginal brance and the PDA branch.    • FOOT SURGERY      right heel   • OTHER SURGICAL HISTORY      Cath stent placement    • RETINAL DETACHMENT REPAIR         Social History:  Social History     Socioeconomic History   • Marital status:      Spouse name: Not on file   • Number of children: Not on file   • Years of education: Not on file   • Highest education level: Not on file   Tobacco Use   • Smoking status: Never Smoker   • Smokeless tobacco: Never Used   Substance and Sexual Activity   • Alcohol use: No     Frequency: Never   • Drug use: No   • Sexual activity: Defer       Allergies:  Allergies   Allergen Reactions   • Demerol [Meperidine] Dizziness         ROS:  Review of Systems   Constitution: Negative for malaise/fatigue.   HENT: Negative for hearing loss and nosebleeds.    Eyes: Negative for double vision and visual disturbance.   Cardiovascular: Positive for dyspnea on exertion. Negative for chest pain, claudication, near-syncope, orthopnea, palpitations, paroxysmal nocturnal dyspnea and syncope.   Respiratory: Negative for cough, shortness of breath, sleep disturbances due to breathing, snoring, sputum production and wheezing.    Endocrine: Negative for cold intolerance, heat intolerance, polydipsia and polyuria.   Hematologic/Lymphatic: Negative for adenopathy and bleeding problem. Does not bruise/bleed easily.   Skin: Negative for flushing and itching.   Musculoskeletal: Negative for back pain, falls, joint pain, joint swelling, muscle weakness and neck pain.   Gastrointestinal: Negative for abdominal pain, dysphagia, heartburn, nausea and vomiting.   Genitourinary: Negative for dysuria and frequency.   Neurological: Negative for disturbances in coordination, dizziness, light-headedness, loss of balance, numbness and weakness.   Psychiatric/Behavioral: Negative for altered mental status and memory loss. The patient  "is not nervous/anxious.    Allergic/Immunologic: Negative for hives and persistent infections.          Objective:         /76   Ht 182.9 cm (72\")   Wt 90.4 kg (199 lb 6.4 oz)   BMI 27.04 kg/m²     Physical Exam not performed    In-Office Procedure(s):  Procedures    ASCVD RIsk Score::  The ASCVD Risk score (Juan SIMPSON Jr., et al., 2013) failed to calculate for the following reasons:    The 2013 ASCVD risk score is only valid for ages 40 to 79        Assessment/Plan:         1. Coronary artery disease involving native coronary artery of native heart without angina pectoris  Stable free of angina    2. S/P CABG (coronary artery bypass graft)  Stable    3. Essential hypertension  Controlled    4. Hyperlipidemia LDL goal <70  Continue low-cholesterol low saturated fat diet  - atorvastatin (LIPITOR) 20 MG tablet; Take 1 tablet by mouth Daily.  Dispense: 90 tablet; Refill: 3  - gemfibrozil (LOPID) 600 MG tablet; Take 1 tablet by mouth 2 (Two) Times a Day Before Meals.  Dispense: 180 tablet; Refill: 3    5. Type 2 diabetes mellitus without complication, without long-term current use of insulin (CMS/[facility])  Diet controlled    Mr. Martinez has maintained his activity while observing the CDC guidelines for social distancing.  He remains free of angina.  His respiratory status is stable.  He tolerates his medical regimen including his anticoagulation well.  I counseled him on the importance of observing a low-cholesterol low saturated fat diet and remaining active and restricting the salt in his diet.  I will arrange for follow-up in 3 months.    I spent 14 minutes on video visit and additional 12 minutes completing electronic medical record documentation.           Yosef Rose MD  06/03/20  .    "

## 2020-07-22 ENCOUNTER — OFFICE VISIT (OUTPATIENT)
Dept: FAMILY MEDICINE CLINIC | Facility: CLINIC | Age: 80
End: 2020-07-22

## 2020-07-22 VITALS
HEIGHT: 72 IN | OXYGEN SATURATION: 98 % | SYSTOLIC BLOOD PRESSURE: 136 MMHG | BODY MASS INDEX: 28.17 KG/M2 | WEIGHT: 208 LBS | TEMPERATURE: 97.5 F | HEART RATE: 74 BPM | DIASTOLIC BLOOD PRESSURE: 80 MMHG

## 2020-07-22 DIAGNOSIS — E11.9 TYPE 2 DIABETES MELLITUS WITHOUT COMPLICATION, WITHOUT LONG-TERM CURRENT USE OF INSULIN (HCC): ICD-10-CM

## 2020-07-22 DIAGNOSIS — Z00.00 MEDICARE ANNUAL WELLNESS VISIT, SUBSEQUENT: Primary | ICD-10-CM

## 2020-07-22 DIAGNOSIS — E78.1 ESSENTIAL HYPERTRIGLYCERIDEMIA: ICD-10-CM

## 2020-07-22 DIAGNOSIS — E78.5 HYPERLIPIDEMIA LDL GOAL <100: ICD-10-CM

## 2020-07-22 DIAGNOSIS — I10 ESSENTIAL HYPERTENSION: ICD-10-CM

## 2020-07-22 PROCEDURE — G0439 PPPS, SUBSEQ VISIT: HCPCS | Performed by: INTERNAL MEDICINE

## 2020-07-22 NOTE — PROGRESS NOTES
Subsequent Medicare Wellness Visit   The ABC's of the Annual Wellness Visit    Chief Complaint   Patient presents with   • Annual Exam       HPI:  Izabela Martinez, SILVESTRE-1940, is a 79 y.o. male who presents for a Subsequent Medicare Wellness Visit.  Here for follow-up of diabetes.  Patient states to have been compliant with medications.  Blood sugar monitoring - patient states has not been checked.  No episodes of hypoglycemia, nausea, vomiting, new rashes, syncope or other issues.  Denies any difficulties and not on any medications.    Follow-up for cholesterol.  Currently has been feeling well without any myalgias, muscle aches, weakness, numbness, chest pain, short of breath or other issues.  Currently is adherent with medication regimen of atorvastatin 20 mg and gemfibrozil 600 mg. and denies medication side effects. Is due for lab follow-up.    Follow-up for history of hypertension.  Currently, has been feeling well and asymptomatic without any headaches,vision changes, cough, chest pain, shortness of breath, swelling, focal neurologic deficit, memory loss or syncope.  Has not been taking any medications.  Dr Rose discontinued the ramipril secondary to hypotension and orthostasis that has now resolved.   Home blood pressure has been running good.      Hearing issues.  Has been around car racing all of his life.      Recent Hospitalizations:  No hospitalization(s) within the last year..    Current Medical Providers:  Patient Care Team:  Ze Yost MD as PCP - General (Internal Medicine)  Yosef Rose MD as Consulting Physician (Cardiology)  Elías Hensley MD as Consulting Physician (Ophthalmology)    Health Habits and Functional and Cognitive Screening and Depression Screening:  Functional & Cognitive Status 2020   Do you have difficulty preparing food and eating? No   Do you have difficulty bathing yourself, getting dressed or grooming yourself? No   Do you have difficulty using the  toilet? No   Do you have difficulty moving around from place to place? No   Do you have trouble with steps or getting out of a bed or a chair? No   Current Diet Well Balanced Diet   Dental Exam Up to date   Eye Exam Up to date   Exercise (times per week) 7 times per week   Current Exercise Activities Include Yard Work   Do you need help using the phone?  No   Are you deaf or do you have serious difficulty hearing?  No   Do you need help with transportation? No   Do you need help shopping? No   Do you need help preparing meals?  No   Do you need help with housework?  No   Do you need help with laundry? No   Do you need help taking your medications? No   Do you need help managing money? No   Do you ever drive or ride in a car without wearing a seat belt? No   Have you felt unusual stress, anger or loneliness in the last month? No   Who do you live with? Spouse   If you need help, do you have trouble finding someone available to you? No   Have you been bothered in the last four weeks by sexual problems? No   Do you have difficulty concentrating, remembering or making decisions? No       Compared to one year ago, the patient feels his physical health is the same and his mental health is the same.    Depression Screen:  PHQ-2/PHQ-9 Depression Screening 7/22/2020   Little interest or pleasure in doing things 0   Feeling down, depressed, or hopeless 0   Trouble falling or staying asleep, or sleeping too much 0   Feeling tired or having little energy 0   Poor appetite or overeating 0   Feeling bad about yourself - or that you are a failure or have let yourself or your family down 0   Trouble concentrating on things, such as reading the newspaper or watching television 0   Moving or speaking so slowly that other people could have noticed. Or the opposite - being so fidgety or restless that you have been moving around a lot more than usual 0   Thoughts that you would be better off dead, or of hurting yourself in some way 0    Total Score 0       Falls Risk Assessment:  CARLOS Fall Risk Clinician Key Questions   Have you fallen in the past year?: No  Do you feel unsteady with walking?: No  Are you worried about falling?: No      Past Medical/Family/Social History:  The following portions of the patient's history were reviewed and updated as appropriate: allergies, current medications, past family history, past medical history, past social history, past surgical history and problem list.    Allergies   Allergen Reactions   • Demerol [Meperidine] Dizziness         Current Outpatient Medications:   •  aspirin 81 MG EC tablet, Take 81 mg by mouth Daily., Disp: , Rfl:   •  atorvastatin (LIPITOR) 20 MG tablet, Take 1 tablet by mouth Daily., Disp: 90 tablet, Rfl: 3  •  gemfibrozil (LOPID) 600 MG tablet, Take 1 tablet by mouth 2 (Two) Times a Day Before Meals., Disp: 180 tablet, Rfl: 3  •  Multiple Vitamin (MULTI-VITAMIN DAILY PO), Take  by mouth., Disp: , Rfl:   •  nitroglycerin (NITROSTAT) 0.4 MG SL tablet, Place 1 tablet under the tongue Every 5 (Five) Minutes As Needed for Chest Pain. Take no more than 3 doses in 15 minutes., Disp: 25 tablet, Rfl: 3  •  rivaroxaban (XARELTO) 20 MG tablet, Take 1 tablet by mouth Daily., Disp: 90 tablet, Rfl: 3  •  vitamin C (ASCORBIC ACID) 500 MG tablet, Take 500 mg by mouth Daily., Disp: , Rfl:   •  vitamin E 400 UNIT capsule, Take 400 Units by mouth Daily., Disp: , Rfl:     Aspirin use counseling: Taking ASA appropriately as indicated    Current medication list contains no high risk medications.  No harmful drug interactions have been identified.     Family History   Problem Relation Age of Onset   • Cancer Mother    • Diabetes Brother    • Heart disease Brother    • Heart disease Maternal Grandmother    • Heart disease Paternal Grandfather        Social History     Tobacco Use   • Smoking status: Never Smoker   • Smokeless tobacco: Never Used   Substance Use Topics   • Alcohol use: No     Frequency: Never        Past Surgical History:   Procedure Laterality Date   • APPENDECTOMY     • CATARACT EXTRACTION  2012    2011 and 2012   • COLONOSCOPY      polyps- recheck 2014. polyps repeat every 3 years   • CORONARY ARTERY BYPASS GRAFT      4 vessel 4/21/17. 4 vessel- Left internal mammary to LAD, saphenous vein graft to diagonal branch, marginal brance and the PDA branch.    • FOOT SURGERY      right heel   • OTHER SURGICAL HISTORY      Cath stent placement    • RETINAL DETACHMENT REPAIR         Patient Active Problem List   Diagnosis   • Hx of adenomatous colonic polyps   • Diabetes mellitus (CMS/HCC)   • Hyperlipidemia LDL goal <100   • Essential hypertension   • CAD (coronary artery disease)   • S/P CABG (coronary artery bypass graft)   • H/O heart artery stent   • History of cardiac cath   • H/O echocardiogram   • History of diagnostic ultrasound   • Family history of early CAD   • Erectile dysfunction   • Anemia   • Thrombocytopenia (CMS/HCC)   • Essential hypertriglyceridemia   • Tremor   • Hearing decreased, bilateral   • Orthostatic dizziness   • Medicare annual wellness visit, subsequent       Review of Systems   Constitutional: Negative for activity change, appetite change, fatigue, fever, unexpected weight gain and unexpected weight loss.   HENT: Negative for nosebleeds, rhinorrhea, trouble swallowing and voice change.    Eyes: Negative for visual disturbance.   Respiratory: Negative for cough, chest tightness, shortness of breath and wheezing.    Cardiovascular: Negative for chest pain, palpitations and leg swelling.   Gastrointestinal: Negative for abdominal pain, blood in stool, constipation, diarrhea, nausea, vomiting, GERD and indigestion.   Genitourinary: Negative for dysuria, frequency and hematuria.   Musculoskeletal: Negative for arthralgias, back pain and myalgias.   Skin: Negative for rash and bruise.   Neurological: Negative for dizziness, tremors, weakness, light-headedness, numbness, headache and  "memory problem.   Hematological: Negative for adenopathy. Does not bruise/bleed easily.   Psychiatric/Behavioral: Negative for sleep disturbance and depressed mood. The patient is not nervous/anxious.        Objective     Vitals:    07/22/20 0855   BP: 136/80   BP Location: Left arm   Patient Position: Sitting   Cuff Size: Large Adult   Pulse: 74   Temp: 97.5 °F (36.4 °C)   TempSrc: Temporal   SpO2: 98%   Weight: 94.3 kg (208 lb)   Height: 182.9 cm (72.01\")       Patient's Body mass index is 28.2 kg/m². BMI is above normal parameters. Recommendations include: exercise counseling and nutrition counseling.      No exam data present    The patient has no evidence of cognitve impairment.     Physical Exam   Constitutional: He is oriented to person, place, and time. He appears well-developed and well-nourished. No distress.   HENT:   Head: Normocephalic and atraumatic.   Right Ear: External ear normal.   Left Ear: External ear normal.   Nose: Nose normal.   Mouth/Throat: Oropharynx is clear and moist.   Eyes: Pupils are equal, round, and reactive to light. Conjunctivae and EOM are normal.   Neck: Normal range of motion. Neck supple. No tracheal deviation present. No thyromegaly present.   Cardiovascular: Normal rate, regular rhythm, normal heart sounds and intact distal pulses. Exam reveals no gallop and no friction rub.   No murmur heard.  Pulmonary/Chest: Effort normal and breath sounds normal. No respiratory distress.   Abdominal: Soft. Bowel sounds are normal. He exhibits no mass. There is no tenderness. There is no guarding.   Musculoskeletal: Normal range of motion. He exhibits no edema.   Lymphadenopathy:     He has no cervical adenopathy.   Neurological: He is alert and oriented to person, place, and time. He displays normal reflexes. He exhibits normal muscle tone.   Skin: Skin is warm and dry. Capillary refill takes less than 2 seconds. No rash noted. He is not diaphoretic.   Psychiatric: He has a normal mood " and affect. His behavior is normal. Judgment and thought content normal.   Nursing note and vitals reviewed.      Recent Lab Results:  Lab Results   Component Value Date     (H) 11/05/2019     Lab Results   Component Value Date    TRIG 86 11/05/2019    HDL 39 (L) 11/05/2019    VLDL 17.2 11/05/2019       Assessment/Plan   Age-appropriate Screening Schedule:  Refer to the list below for future screening recommendations based on patient's age, sex and/or medical conditions.      Health Maintenance   Topic Date Due   • TDAP/TD VACCINES (1 - Tdap) 10/01/1951   • HEMOGLOBIN A1C  05/05/2020   • INFLUENZA VACCINE  08/01/2020   • DIABETIC EYE EXAM  08/01/2020   • LIPID PANEL  11/05/2020   • URINE MICROALBUMIN  11/05/2020   • COLONOSCOPY  02/26/2021   • ZOSTER VACCINE  Completed       Medicare Risks and Personalized Health Plan:  Advance Directive Discussion  Fall Risk  Glaucoma Risk  Immunizations Discussed/Encouraged (specific immunizations; adacel Tdap and Shingrix )  Obesity/Overweight       CMS-Preventive Services Quick Reference  Medicare Preventive Services Addressed:  Annual Wellness Visit (AWV)  Glaucoma screening (for individuals with diabetes mellitus, family history of glaucoma, -Americans (> or =) age 50, -Americans (> or =) age 65)    Advance Care Planning:  ACP discussion was held with the patient during this visit. Patient has an advance directive (not in EMR), copy requested.    Diagnoses and all orders for this visit:    1. Medicare annual wellness visit, subsequent (Primary)    2. Essential hypertension  -     Comprehensive Metabolic Panel  -     Lipid Panel    3. Hyperlipidemia LDL goal <100  -     Comprehensive Metabolic Panel  -     Lipid Panel    4. Essential hypertriglyceridemia  -     Comprehensive Metabolic Panel  -     Lipid Panel    5. Type 2 diabetes mellitus without complication, without long-term current use of insulin (CMS/[facility])  -     Comprehensive Metabolic Panel  -      Lipid Panel  -     Hemoglobin A1c      Annual wellness visit reviewed with patient.  All past history, medications, social history, and problem list were reviewed.  Discussed advanced directives and living will.  Patient has living will: Living will: yes and patient will bring copy to office.  Discussed fall risk and precautions encourage removing throw rugs and using grab bars within the home and bathroom.  Will check the labs as ordered above to evaluate the blood sugars, kidney, liver, cholesterol for screening.  Discussed flu shot recommended to get the high-dose influenza vaccine annually in the fall.  Prevnar-13 and pneumovax-23 up to date and appropriate.  Shingrix vaccination series up to date.  Encourage follow-up with the eye doctor on annual basis for glaucoma and diabetic evaluation.  Discussed weight and encouraged exercise as tolerated while following a healthy diet.  Follow up with current specialists of cardiology and opthalmology as needed.    An After Visit Summary and PPPS with all of these plans were given to the patient.      Follow Up:  Return in about 6 months (around 1/22/2021).

## 2020-07-23 LAB
ALBUMIN SERPL-MCNC: 4.6 G/DL (ref 3.5–5.2)
ALBUMIN/GLOB SERPL: 2.4 G/DL
ALP SERPL-CCNC: 98 U/L (ref 39–117)
ALT SERPL-CCNC: 16 U/L (ref 1–41)
AST SERPL-CCNC: 14 U/L (ref 1–40)
BILIRUB SERPL-MCNC: 0.6 MG/DL (ref 0–1.2)
BUN SERPL-MCNC: 21 MG/DL (ref 8–23)
BUN/CREAT SERPL: 20.2 (ref 7–25)
CALCIUM SERPL-MCNC: 9.5 MG/DL (ref 8.6–10.5)
CHLORIDE SERPL-SCNC: 107 MMOL/L (ref 98–107)
CHOLEST SERPL-MCNC: 123 MG/DL (ref 0–200)
CO2 SERPL-SCNC: 24.9 MMOL/L (ref 22–29)
CREAT SERPL-MCNC: 1.04 MG/DL (ref 0.76–1.27)
GLOBULIN SER CALC-MCNC: 1.9 GM/DL
GLUCOSE SERPL-MCNC: 126 MG/DL (ref 65–99)
HBA1C MFR BLD: 7.2 % (ref 4.8–5.6)
HDLC SERPL-MCNC: 44 MG/DL (ref 40–60)
LDLC SERPL CALC-MCNC: 69 MG/DL (ref 0–100)
POTASSIUM SERPL-SCNC: 4.8 MMOL/L (ref 3.5–5.2)
PROT SERPL-MCNC: 6.5 G/DL (ref 6–8.5)
SODIUM SERPL-SCNC: 143 MMOL/L (ref 136–145)
TRIGL SERPL-MCNC: 49 MG/DL (ref 0–150)
VLDLC SERPL CALC-MCNC: 9.8 MG/DL

## 2020-10-07 ENCOUNTER — OFFICE VISIT (OUTPATIENT)
Dept: CARDIOLOGY | Facility: CLINIC | Age: 80
End: 2020-10-07

## 2020-10-07 VITALS
SYSTOLIC BLOOD PRESSURE: 108 MMHG | DIASTOLIC BLOOD PRESSURE: 68 MMHG | HEIGHT: 72 IN | BODY MASS INDEX: 27.63 KG/M2 | HEART RATE: 70 BPM | WEIGHT: 204 LBS

## 2020-10-07 DIAGNOSIS — Z95.5 H/O HEART ARTERY STENT: ICD-10-CM

## 2020-10-07 DIAGNOSIS — E11.9 TYPE 2 DIABETES MELLITUS WITHOUT COMPLICATION, WITHOUT LONG-TERM CURRENT USE OF INSULIN (HCC): ICD-10-CM

## 2020-10-07 DIAGNOSIS — E78.5 HYPERLIPIDEMIA LDL GOAL <100: ICD-10-CM

## 2020-10-07 DIAGNOSIS — I10 ESSENTIAL HYPERTENSION: ICD-10-CM

## 2020-10-07 DIAGNOSIS — Z95.1 S/P CABG (CORONARY ARTERY BYPASS GRAFT): ICD-10-CM

## 2020-10-07 DIAGNOSIS — I25.10 CORONARY ARTERY DISEASE INVOLVING NATIVE CORONARY ARTERY OF NATIVE HEART WITHOUT ANGINA PECTORIS: Primary | ICD-10-CM

## 2020-10-07 PROCEDURE — 99214 OFFICE O/P EST MOD 30 MIN: CPT | Performed by: INTERNAL MEDICINE

## 2020-10-07 RX ORDER — RAMIPRIL 2.5 MG/1
2.5 CAPSULE ORAL DAILY
COMMUNITY
End: 2020-10-07 | Stop reason: SDUPTHER

## 2020-10-07 RX ORDER — RAMIPRIL 2.5 MG/1
2.5 CAPSULE ORAL DAILY
Qty: 90 CAPSULE | Refills: 3 | Status: SHIPPED | OUTPATIENT
Start: 2020-10-07 | End: 2020-10-08

## 2020-10-07 RX ORDER — GEMFIBROZIL 600 MG/1
600 TABLET, FILM COATED ORAL
Qty: 180 TABLET | Refills: 3 | Status: SHIPPED | OUTPATIENT
Start: 2020-10-07 | End: 2022-01-31 | Stop reason: SDUPTHER

## 2020-10-07 RX ORDER — ATORVASTATIN CALCIUM 20 MG/1
20 TABLET, FILM COATED ORAL DAILY
Qty: 90 TABLET | Refills: 3 | Status: SHIPPED | OUTPATIENT
Start: 2020-10-07 | End: 2021-09-27

## 2020-10-07 NOTE — PROGRESS NOTES
\[facility] [place] [facility]\"" [facility]        Subjective:     Encounter Date:10/07/2020      Patient ID: Izabela Martinez is a 80 y.o. male.      HPI: I saw Izabela Martinez today for cardiovascular care.  He is a pleasant 80-year-old male who observes the CDC guidelines for social distancing.  He remains free of fever cough or increased shortness of breath.  Mr. Martinez is very active.  He remains free of chest pain pressure tightness and does not report any significant respiratory insufficiency.  He denies orthopnea or PND no lower extremity edema.  He does not experience syncope near syncope or palpitation.  He has a known history of coronary heart disease status post redo coronary artery bypass surgery as well as prior percutaneous coronary interventions.  He has a history of hypertension which is controlled.  He has known dyslipidemia and remains on atorvastatin 20 mg nightly.  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      The following portions of the patient's history were reviewed and updated as appropriate: allergies, current medications, past family history, past medical history, past social history, past surgical history and problem list.    Problem List:  Patient Active Problem List   Diagnosis   • Hx of adenomatous colonic polyps   • Diabetes mellitus (CMS/HCC)   • Hyperlipidemia LDL goal <100   • Essential hypertension   • CAD (coronary artery disease)   • S/P CABG (coronary artery bypass graft)   • H/O heart artery stent   • History of cardiac cath   • H/O echocardiogram   • History of diagnostic ultrasound   • Family history of early CAD   • Erectile dysfunction   • Anemia   • Thrombocytopenia (CMS/HCC)   • Essential hypertriglyceridemia   • Tremor   • Hearing decreased, bilateral   • Orthostatic dizziness   • Medicare annual wellness visit, subsequent       Past Medical History:  Past Medical History:   Diagnosis Date   • Allergic cough    • Anemia    •  Arteriosclerosis of coronary artery    • Benign enlargement of prostate    • Benign essential hypertension    • Blister of foot, left    • BMI 30.0-30.9,adult    • CAD (coronary artery disease) 6/11/2019   • Cough    • Diabetes mellitus (CMS/HCC) 6/11/2019   • Disorder of skin    • Essential hypertension 6/11/2019    Benign   • Essential hypertriglyceridemia    • H/O echocardiogram 6/11/2019    3/21/14 - Mild concentric LVG.  EF 55-60%.  Mild MR.  Moderate KS.     • H/O heart artery stent 6/11/2019    3/20/14 - PCI to the SVG of the RCA with a RESOLUTE drug eluting stent   • Hammer toe     right second-fourth toes    • History of cancer    • History of cardiac cath 6/11/2019 4/17/17 - Severe three vessel obstructive CAD.  Severely diseased vein grafts (SVG to ?OM, and SVG to RCA) that are the source of the patient's unstable angina..  EF 65%.   • History of diagnostic ultrasound 6/11/2019 4/18/17 - CAROTID ULTRASOUND - No evidence of significant carotid stenosis.  NASCET criteria was utilized.   • History of echocardiogram    • History of nuclear stress test    • Hyperlipidemia LDL goal <100 6/11/2019   • Hypertrophy of prostate     Benign   • Hypocalcemia    • Hypokalemia    • Inability to attain erection    • Medicare annual wellness visit, subsequent    • JAYCOB on CPAP    • Right retinal detachment    • S/P CABG (coronary artery bypass graft) 6/11/2019    Redo CABG 4/2017   • S/P cardiac catheterization    • S/P coronary artery stent placement    • Thrombocytopenia (CMS/HCC)        Past Surgical History:  Past Surgical History:   Procedure Laterality Date   • APPENDECTOMY     • CATARACT EXTRACTION  2012 2011 and 2012   • COLONOSCOPY      polyps- recheck 2014. polyps repeat every 3 years   • CORONARY ARTERY BYPASS GRAFT      4 vessel 4/21/17. 4 vessel- Left internal mammary to LAD, saphenous vein graft to diagonal branch, marginal brance and the PDA branch.    • FOOT SURGERY      right heel   • OTHER  "SURGICAL HISTORY      Cath stent placement    • RETINAL DETACHMENT REPAIR         Social History:  Social History     Socioeconomic History   • Marital status:      Spouse name: Not on file   • Number of children: Not on file   • Years of education: Not on file   • Highest education level: Not on file   Tobacco Use   • Smoking status: Never Smoker   • Smokeless tobacco: Never Used   Substance and Sexual Activity   • Alcohol use: No     Frequency: Never   • Drug use: No   • Sexual activity: Defer       Allergies:  Allergies   Allergen Reactions   • Demerol [Meperidine] Dizziness         ROS:  Review of Systems   Constitution: Negative for malaise/fatigue.   HENT: Negative for hearing loss and nosebleeds.    Eyes: Negative for double vision and visual disturbance.   Cardiovascular: Negative for chest pain, claudication, dyspnea on exertion, near-syncope, orthopnea, palpitations, paroxysmal nocturnal dyspnea and syncope.   Respiratory: Negative for cough, shortness of breath, sleep disturbances due to breathing, snoring, sputum production and wheezing.    Endocrine: Negative for cold intolerance, heat intolerance, polydipsia and polyuria.   Hematologic/Lymphatic: Negative for adenopathy and bleeding problem. Does not bruise/bleed easily.   Skin: Negative for flushing and itching.   Musculoskeletal: Negative for back pain, falls, joint pain, joint swelling, muscle weakness and neck pain.   Gastrointestinal: Negative for abdominal pain, dysphagia, heartburn, nausea and vomiting.   Genitourinary: Negative for dysuria and frequency.   Neurological: Negative for disturbances in coordination, dizziness, light-headedness, loss of balance, numbness and weakness.   Psychiatric/Behavioral: Negative for altered mental status and memory loss. The patient is not nervous/anxious.    Allergic/Immunologic: Negative for hives and persistent infections.          Objective:         /68   Pulse 70   Ht 182.9 cm (72\")   Wt " 92.5 kg (204 lb)   BMI 27.67 kg/m²     Constitutional:       Appearance: Well-developed.   Eyes:      Conjunctiva/sclera: Conjunctivae normal.      Pupils: Pupils are equal, round, and reactive to light.   HENT:      Head: Normocephalic and atraumatic.   Neck:      Musculoskeletal: Neck supple.      Thyroid: No thyromegaly.   Pulmonary:      Effort: Pulmonary effort is normal. No respiratory distress.      Breath sounds: Normal breath sounds. No wheezing. No rales.   Cardiovascular:      Normal rate. Regular rhythm.      S4 Gallop. No S3 gallop. Midsystolic click click.   Edema:     Peripheral edema absent.   Abdominal:      General: Bowel sounds are normal. There is no distension.      Palpations: Abdomen is soft. There is no abdominal mass.      Tenderness: There is no abdominal tenderness.   Musculoskeletal: Normal range of motion.   Skin:     General: Skin is warm and dry.      Findings: No erythema.   Neurological:      Mental Status: Alert and oriented to person, place, and time.         In-Office Procedure(s):  Procedures    ASCVD RIsk Score::  The ASCVD Risk score ([place] DC Jr., et al., 2013) failed to calculate for the following reasons:    The 2013 ASCVD risk score is only valid for ages 40 to 79        Assessment/Plan:         1. Coronary artery disease involving native coronary artery of native heart without angina pectoris  Stable free of angina    2. S/P CABG (coronary artery bypass graft)  Stable    3. H/O heart artery stent  Stable    4. Essential hypertension  Controlled    5. Hyperlipidemia LDL goal <70  Low-cholesterol low saturated fat diet  - gemfibrozil (LOPID) 600 MG tablet; Take 1 tablet by mouth 2 (Two) Times a Day Before Meals.  Dispense: 180 tablet; Refill: 3  - atorvastatin (LIPITOR) 20 MG tablet; Take 1 tablet by mouth Daily.  Dispense: 90 tablet; Refill: 3    6. Type 2 diabetes mellitus without complication, without long-term current use of insulin (CMS/[facility])  Diet controlled      Michelle remained stable from a cardiovascular standpoint.  He is a very active individual and understands the importance of continued risk modification by observing a low-cholesterol low saturated fat diet.  He follows his medical regimen closely.  I will see him in follow-up in 6 months but sooner if needed.           Yosef Rose MD  10/07/20  .

## 2021-01-20 ENCOUNTER — OFFICE VISIT (OUTPATIENT)
Dept: FAMILY MEDICINE CLINIC | Facility: CLINIC | Age: 81
End: 2021-01-20

## 2021-01-20 VITALS
BODY MASS INDEX: 28.58 KG/M2 | OXYGEN SATURATION: 100 % | TEMPERATURE: 97.4 F | HEIGHT: 72 IN | SYSTOLIC BLOOD PRESSURE: 120 MMHG | WEIGHT: 211 LBS | DIASTOLIC BLOOD PRESSURE: 78 MMHG | HEART RATE: 68 BPM

## 2021-01-20 DIAGNOSIS — E11.9 TYPE 2 DIABETES MELLITUS WITHOUT COMPLICATION, WITHOUT LONG-TERM CURRENT USE OF INSULIN (HCC): Primary | ICD-10-CM

## 2021-01-20 DIAGNOSIS — E78.1 ESSENTIAL HYPERTRIGLYCERIDEMIA: ICD-10-CM

## 2021-01-20 DIAGNOSIS — I10 ESSENTIAL HYPERTENSION: ICD-10-CM

## 2021-01-20 PROCEDURE — 99213 OFFICE O/P EST LOW 20 MIN: CPT | Performed by: INTERNAL MEDICINE

## 2021-01-20 NOTE — PROGRESS NOTES
Subjective   Izabela Martinez is a 80 y.o. male.     Chief Complaint   Patient presents with   • Hypertension   • Hyperlipidemia       History of Present Illness   Answers for HPI/ROS submitted by the patient on 1/14/2021   What is the primary reason for your visit?: Other  Please describe your symptoms.: follow up appt diabetes  Have you had these symptoms before?: Yes  How long have you been having these symptoms?: Greater than 2 weeks  Please list any medications you are currently taking for this condition.: none for diabetes    Here for follow-up of diabetes.  Patient states to have been compliant with medications.  Blood sugar monitoring - patient states has not been checked.  No episodes of hypoglycemia, nausea, vomiting, new rashes, syncope or other issues.  Denies any difficulties and not on any medications.  Last A1c on 7/22/2020 of 7.2%.     Follow-up for cholesterol.  Currently, has been feeling well without any myalgias, muscle aches, weakness, numbness, chest pain, short of breath or other issues.  Currently, is adherent with medication regimen of atorvastatin 20 mg and gemfibrozil 600 mg. and denies medication side effects. Is due for lab follow-up.     Follow-up for history of hypertension.  Currently, has been feeling well and asymptomatic without any headaches, vision changes, cough, chest pain, shortness of breath, swelling, focal neurologic deficit, memory loss or syncope.  Has not been taking any medications.  Dr Rose discontinued the ramipril secondary to hypotension and orthostasis that has now resolved.   Home blood pressure has been running good.         The following portions of the patient's history were reviewed and updated as appropriate: allergies, current medications, past family history, past medical history, past social history, past surgical history and problem list.    Depression Screen:  PHQ-2/PHQ-9 Depression Screening 7/22/2020   Little interest or pleasure in doing things 0    Feeling down, depressed, or hopeless 0   Trouble falling or staying asleep, or sleeping too much 0   Feeling tired or having little energy 0   Poor appetite or overeating 0   Feeling bad about yourself - or that you are a failure or have let yourself or your family down 0   Trouble concentrating on things, such as reading the newspaper or watching television 0   Moving or speaking so slowly that other people could have noticed. Or the opposite - being so fidgety or restless that you have been moving around a lot more than usual 0   Thoughts that you would be better off dead, or of hurting yourself in some way 0   Total Score 0       Past Medical History:   Diagnosis Date   • Allergic cough    • Anemia    • Arteriosclerosis of coronary artery    • Benign enlargement of prostate    • Benign essential hypertension    • Blister of foot, left    • BMI 30.0-30.9,adult    • CAD (coronary artery disease) 6/11/2019   • Cough    • Diabetes mellitus (CMS/HCC) 6/11/2019   • Disorder of skin    • Essential hypertension 6/11/2019    Benign   • Essential hypertriglyceridemia    • H/O echocardiogram 6/11/2019    3/21/14 - Mild concentric LVG.  EF 55-60%.  Mild MR.  Moderate LA.     • H/O heart artery stent 6/11/2019    3/20/14 - PCI to the SVG of the RCA with a RESOLUTE drug eluting stent   • Hammer toe     right second-fourth toes    • History of cancer    • History of cardiac cath 6/11/2019 4/17/17 - Severe three vessel obstructive CAD.  Severely diseased vein grafts (SVG to ?OM, and SVG to RCA) that are the source of the patient's unstable angina..  EF 65%.   • History of diagnostic ultrasound 6/11/2019 4/18/17 - CAROTID ULTRASOUND - No evidence of significant carotid stenosis.  NASCET criteria was utilized.   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 polyps repeat every 3 years   • CORONARY ARTERY BYPASS GRAFT      4 vessel 4/21/17. 4 vessel- Left internal mammary to LAD, saphenous vein graft to diagonal branch, marginal brance and the PDA branch.    • FOOT SURGERY      right heel   • OTHER SURGICAL HISTORY      Cath stent placement    • RETINAL DETACHMENT REPAIR         Family History   Problem Relation Age of Onset   • Cancer Mother    • Diabetes Brother    • Heart disease Brother    • Heart disease Maternal Grandmother    • Heart disease Paternal Grandfather        Social History     Socioeconomic History   • Marital status:      Spouse name: Not on file   • Number of children: Not on file   • Years of education: Not on file   • Highest education level: Not on file   Tobacco Use   • Smoking status: Never Smoker   • Smokeless tobacco: Never Used   Substance and Sexual Activity   • Alcohol use: No     Frequency: Never   • Drug use: No   • Sexual activity: Defer       Current Outpatient Medications   Medication Sig Dispense Refill   • aspirin 81 MG EC tablet Take 81 mg by mouth Daily.     • atorvastatin (LIPITOR) 20 MG tablet Take 1 tablet by mouth Daily. 90 tablet 3   • gemfibrozil (LOPID) 600 MG tablet Take 1 tablet by mouth 2 (Two) Times a Day Before Meals. 180 tablet 3   • Multiple Vitamin (MULTI-VITAMIN DAILY PO) Take  by mouth.     • nitroglycerin (NITROSTAT) 0.4 MG SL tablet Place 1 tablet under the tongue Every 5 (Five) Minutes As Needed for Chest Pain. Take no more than 3 doses in 15 minutes. 25 tablet 3   •  "rivaroxaban (XARELTO) 20 MG tablet Take 1 tablet by mouth Daily. 90 tablet 3   • vitamin C (ASCORBIC ACID) 500 MG tablet Take 500 mg by mouth Daily.     • vitamin E 400 UNIT capsule Take 400 Units by mouth Daily.       No current facility-administered medications for this visit.        Review of Systems   Constitutional: Negative for activity change, appetite change, fatigue, fever, unexpected weight gain and unexpected weight loss.   HENT: Negative for nosebleeds, rhinorrhea, trouble swallowing and voice change.    Eyes: Negative for visual disturbance.   Respiratory: Negative for cough, chest tightness, shortness of breath and wheezing.    Cardiovascular: Negative for chest pain, palpitations and leg swelling.   Gastrointestinal: Negative for abdominal pain, blood in stool, constipation, diarrhea, nausea, vomiting, GERD and indigestion.   Genitourinary: Negative for dysuria, frequency and hematuria.   Musculoskeletal: Negative for arthralgias, back pain and myalgias.   Skin: Negative for rash and bruise.   Neurological: Negative for dizziness, tremors, weakness, light-headedness, numbness, headache and memory problem.   Hematological: Negative for adenopathy. Does not bruise/bleed easily.   Psychiatric/Behavioral: Negative for sleep disturbance and depressed mood. The patient is not nervous/anxious.        Objective   /78 (BP Location: Right arm, Patient Position: Sitting, Cuff Size: Adult)   Pulse 68   Temp 97.4 °F (36.3 °C) (Temporal)   Ht 182.9 cm (72.01\")   Wt 95.7 kg (211 lb)   SpO2 100%   BMI 28.61 kg/m²     Physical Exam  Vitals signs and nursing note reviewed.   Constitutional:       General: He is not in acute distress.     Appearance: He is well-developed. He is not diaphoretic.   HENT:      Head: Normocephalic and atraumatic.      Right Ear: External ear normal.      Left Ear: External ear normal.      Nose: Nose normal.   Eyes:      Conjunctiva/sclera: Conjunctivae normal.      Pupils: " Pupils are equal, round, and reactive to light.   Neck:      Musculoskeletal: Normal range of motion and neck supple.      Thyroid: No thyromegaly.      Trachea: No tracheal deviation.   Cardiovascular:      Rate and Rhythm: Normal rate and regular rhythm.      Heart sounds: Normal heart sounds. No murmur. No friction rub. No gallop.    Pulmonary:      Effort: Pulmonary effort is normal. No respiratory distress.      Breath sounds: Normal breath sounds.   Abdominal:      General: Bowel sounds are normal.      Palpations: Abdomen is soft. There is no mass.      Tenderness: There is no abdominal tenderness. There is no guarding.   Musculoskeletal: Normal range of motion.   Lymphadenopathy:      Cervical: No cervical adenopathy.   Skin:     General: Skin is warm and dry.      Capillary Refill: Capillary refill takes less than 2 seconds.      Findings: No rash.   Neurological:      Mental Status: He is alert and oriented to person, place, and time.      Motor: No abnormal muscle tone.      Deep Tendon Reflexes: Reflexes normal.   Psychiatric:         Behavior: Behavior normal.         Thought Content: Thought content normal.         Judgment: Judgment normal.         No results found for this or any previous visit (from the past 2016 hour(s)).  Assessment/Plan   Diagnoses and all orders for this visit:    1. Type 2 diabetes mellitus without complication, without long-term current use of insulin (CMS/[facility]) (Primary)  -     Hemoglobin A1c  -     Comprehensive Metabolic Panel  -     Lipid Panel    2. Essential hypertension  -     Comprehensive Metabolic Panel  -     Lipid Panel    3. Essential hypertriglyceridemia  -     Comprehensive Metabolic Panel  -     Lipid Panel    Diabetes slightly elevated on last A1c's of 7.2% we will await the new A1c.  If he has increasing levels then we may wish to consider Metformin or an SGLT2 inhibitor with his history of heart disease.  Hypertension is well controlled no changes are needed  at this time.  History of hypertriglyceridemia we will see what his lipids are but continue the gemfibrozil unchanged.  We discussed the Xarelto and the cost but the importance of taking.  He will continue this at this time.           · COVID-19 Precautions - Patient was compliant in wearing a mask. When I saw the patient, I used appropriate personal protective equipment (PPE) including mask and eye shield (standard procedure).  Additionally, I used gown and gloves if indicated.  Hand hygiene was completed before and after seeing the patient.  · Dictated utilizing Dragon Dictation

## 2021-01-21 ENCOUNTER — TELEPHONE (OUTPATIENT)
Dept: FAMILY MEDICINE CLINIC | Facility: CLINIC | Age: 81
End: 2021-01-21

## 2021-01-21 DIAGNOSIS — E11.9 TYPE 2 DIABETES MELLITUS WITHOUT COMPLICATION, WITHOUT LONG-TERM CURRENT USE OF INSULIN (HCC): Primary | ICD-10-CM

## 2021-01-21 LAB
ALBUMIN SERPL-MCNC: 4.4 G/DL (ref 3.5–5.2)
ALBUMIN/GLOB SERPL: 2.1 G/DL
ALP SERPL-CCNC: 109 U/L (ref 39–117)
ALT SERPL-CCNC: 17 U/L (ref 1–41)
AST SERPL-CCNC: 21 U/L (ref 1–40)
BILIRUB SERPL-MCNC: 0.4 MG/DL (ref 0–1.2)
BUN SERPL-MCNC: 25 MG/DL (ref 8–23)
BUN/CREAT SERPL: 23.6 (ref 7–25)
CALCIUM SERPL-MCNC: 9.1 MG/DL (ref 8.6–10.5)
CHLORIDE SERPL-SCNC: 104 MMOL/L (ref 98–107)
CHOLEST SERPL-MCNC: 126 MG/DL (ref 0–200)
CO2 SERPL-SCNC: 25.1 MMOL/L (ref 22–29)
CREAT SERPL-MCNC: 1.06 MG/DL (ref 0.76–1.27)
GLOBULIN SER CALC-MCNC: 2.1 GM/DL
GLUCOSE SERPL-MCNC: 135 MG/DL (ref 65–99)
HBA1C MFR BLD: 7.7 % (ref 4.8–5.6)
HDLC SERPL-MCNC: 42 MG/DL (ref 40–60)
LDLC SERPL CALC-MCNC: 69 MG/DL (ref 0–100)
POTASSIUM SERPL-SCNC: 4.9 MMOL/L (ref 3.5–5.2)
PROT SERPL-MCNC: 6.5 G/DL (ref 6–8.5)
SODIUM SERPL-SCNC: 140 MMOL/L (ref 136–145)
TRIGL SERPL-MCNC: 74 MG/DL (ref 0–150)
VLDLC SERPL CALC-MCNC: 15 MG/DL (ref 5–40)

## 2021-01-21 RX ORDER — DAPAGLIFLOZIN 5 MG/1
5 TABLET, FILM COATED ORAL DAILY
Qty: 30 TABLET | Refills: 3 | Status: SHIPPED | OUTPATIENT
Start: 2021-01-21 | End: 2021-01-25

## 2021-01-25 DIAGNOSIS — E11.9 TYPE 2 DIABETES MELLITUS WITHOUT COMPLICATION, WITHOUT LONG-TERM CURRENT USE OF INSULIN (HCC): Primary | ICD-10-CM

## 2021-01-25 RX ORDER — METFORMIN HYDROCHLORIDE 500 MG/1
TABLET, EXTENDED RELEASE ORAL
Qty: 60 TABLET | Refills: 3 | Status: SHIPPED | OUTPATIENT
Start: 2021-01-25 | End: 2021-07-01 | Stop reason: SDUPTHER

## 2021-04-08 PROBLEM — I25.10 CAD (CORONARY ARTERY DISEASE): Chronic | Status: ACTIVE | Noted: 2019-06-11

## 2021-04-08 PROBLEM — E11.9 DIABETES MELLITUS (HCC): Chronic | Status: ACTIVE | Noted: 2019-06-11

## 2021-04-08 PROBLEM — E78.5 HYPERLIPIDEMIA LDL GOAL <100: Chronic | Status: ACTIVE | Noted: 2019-06-11

## 2021-04-08 PROBLEM — E78.1 ESSENTIAL HYPERTRIGLYCERIDEMIA: Chronic | Status: ACTIVE | Noted: 2019-11-05

## 2021-04-08 PROBLEM — Z95.1 S/P CABG (CORONARY ARTERY BYPASS GRAFT): Chronic | Status: ACTIVE | Noted: 2019-06-11

## 2021-04-08 PROBLEM — I10 ESSENTIAL HYPERTENSION: Chronic | Status: ACTIVE | Noted: 2019-06-11

## 2021-04-08 PROBLEM — Z95.5 H/O HEART ARTERY STENT: Chronic | Status: ACTIVE | Noted: 2019-06-11

## 2021-04-09 ENCOUNTER — OFFICE VISIT (OUTPATIENT)
Dept: CARDIOLOGY | Facility: CLINIC | Age: 81
End: 2021-04-09

## 2021-04-09 VITALS
HEIGHT: 72 IN | HEART RATE: 74 BPM | BODY MASS INDEX: 28.77 KG/M2 | DIASTOLIC BLOOD PRESSURE: 70 MMHG | RESPIRATION RATE: 18 BRPM | SYSTOLIC BLOOD PRESSURE: 110 MMHG | WEIGHT: 212.4 LBS

## 2021-04-09 DIAGNOSIS — Z95.5 H/O HEART ARTERY STENT: Chronic | ICD-10-CM

## 2021-04-09 DIAGNOSIS — I25.10 CORONARY ARTERY DISEASE INVOLVING NATIVE CORONARY ARTERY OF NATIVE HEART WITHOUT ANGINA PECTORIS: Primary | Chronic | ICD-10-CM

## 2021-04-09 DIAGNOSIS — E11.9 TYPE 2 DIABETES MELLITUS WITHOUT COMPLICATION, WITHOUT LONG-TERM CURRENT USE OF INSULIN (HCC): Chronic | ICD-10-CM

## 2021-04-09 DIAGNOSIS — R55 NEAR SYNCOPE: ICD-10-CM

## 2021-04-09 DIAGNOSIS — E78.5 HYPERLIPIDEMIA LDL GOAL <100: Chronic | ICD-10-CM

## 2021-04-09 DIAGNOSIS — E78.1 ESSENTIAL HYPERTRIGLYCERIDEMIA: Chronic | ICD-10-CM

## 2021-04-09 DIAGNOSIS — I10 ESSENTIAL HYPERTENSION: Chronic | ICD-10-CM

## 2021-04-09 DIAGNOSIS — Z95.1 S/P CABG (CORONARY ARTERY BYPASS GRAFT): Chronic | ICD-10-CM

## 2021-04-09 PROCEDURE — 99215 OFFICE O/P EST HI 40 MIN: CPT | Performed by: INTERNAL MEDICINE

## 2021-04-09 NOTE — PROGRESS NOTES
"Chief Complaint  Follow-up    Subjective    History of Present Illness      I saw Izabela Martinez today for continued cardiovascular care.  He is a very pleasant 80-year-old male who observes the CDC guidelines during the coronavirus pandemic.  He is received his COVID-19 vaccine.  Mr. Martinez has known coronary heart disease.  He has undergone redo coronary artery bypass surgery 4/21/2017 with a left internal mammary graft to the LAD, saphenous vein graft to the diagonal branch, obtuse marginal branch and the PDA branch.  He remains free of angina.  He does not report any significant or progressive dyspnea on exertion.  He denies orthopnea PND or lower extremity edema.  He does report recurrent dizziness.  He does not feel this is associated with change of position from lying or sitting to standing.  He associates this dizziness with initiation of Metformin.  He also notices dizziness with movement of his head.Review of his last echocardiogram from 11/14/2018 reveals left ventricular ejection fraction 55%, mild concentric left ventricular hypertrophy with grade 2 diastolic dysfunction, mild mitral tricuspid and pulmonic insufficiency.    Initial blood pressure was 110/70.  We repeated blood pressure lying was 120/70 heart rate 74, sitting 132/72, standing 128/72.    Objective   Vital Signs:   /70 (BP Location: Left arm, Patient Position: Sitting)   Pulse 74   Resp 18   Ht 182.9 cm (72\")   Wt 96.3 kg (212 lb 6.4 oz)   BMI 28.81 kg/m²     Constitutional:       Appearance: Well-developed.   Eyes:      Conjunctiva/sclera: Conjunctivae normal.      Pupils: Pupils are equal, round, and reactive to light.   HENT:      Head: Normocephalic and atraumatic.   Neck:      Thyroid: No thyromegaly.   Pulmonary:      Effort: Pulmonary effort is normal. No respiratory distress.      Breath sounds: Normal breath sounds. No wheezing. No rales.   Cardiovascular:      Normal rate. Regular rhythm.      S4 Gallop. No S3 gallop. " Midsystolic click click.   Edema:     Peripheral edema absent.   Abdominal:      General: Bowel sounds are normal. There is no distension.      Palpations: Abdomen is soft. There is no abdominal mass.      Tenderness: There is no abdominal tenderness.   Musculoskeletal: Normal range of motion.      Cervical back: Neck supple. Skin:     General: Skin is warm and dry.      Findings: No erythema.   Neurological:      Mental Status: Alert and oriented to person, place, and time.         Result Review :     Common labs    Common Labsle 7/22/20 7/22/20 7/22/20 1/20/21 1/20/21 1/20/21    0947 0947 0947 0911 0911 0911   Glucose 126 (A)    135 (A)    BUN 21    25 (A)    Creatinine 1.04    1.06    eGFR Non  Am 69    67    eGFR African Am 83    81    Sodium 143    140    Potassium 4.8    4.9    Chloride 107    104    Calcium 9.5    9.1    Total Protein 6.5    6.5    Albumin 4.60    4.40    Total Bilirubin 0.6    0.4    Alkaline Phosphatase 98    109    AST (SGOT) 14    21    ALT (SGPT) 16    17    Total Cholesterol  123    126   Triglycerides  49    74   HDL Cholesterol  44    42   LDL Cholesterol   69    69   Hemoglobin A1C   7.20 (A) 7.70 (A)     (A) Abnormal value       Comments are available for some flowsheets but are not being displayed.           Data reviewed: Cardiology studies Echocardiogram 11/14/2018 Carotid ultrasound 4/18/2017 did not reveal any evidence of significant carotid disease bilaterally         Assessment and Plan    1. Coronary artery disease involving native coronary artery of native heart without angina pectoris  Stable free of chest pain  - Stress Test With Myocardial Perfusion One Day; Future    2. S/P CABG (coronary artery bypass graft)  Stable free of chest pain  - Stress Test With Myocardial Perfusion One Day; Future    3. H/O heart artery stent  Stable free of chest pain  - Stress Test With Myocardial Perfusion One Day; Future    4. Essential hypertension  No clear orthostatic drop or  symptoms    5. Essential hypertriglyceridemia  Continue low-cholesterol low saturated fat diet    6. Hyperlipidemia LDL goal <100  Continue atorvastatin 20 mg daily    7. Type 2 diabetes mellitus without complication, without long-term current use of insulin (CMS/[facility])      8. Near syncope  Recurrent  - Holter Monitor - 72 Hour Up To 15 Days; Future  - Adult Transthoracic Echo Complete W/ Cont if Necessary Per Protocol; Future  - Stress Test With Myocardial Perfusion One Day; Future    Mr. Martinez does not report any symptoms of his prior typical angina.  On physical exam he is euvolemic.  He reports recurrent dizziness with near syncope which he temporally relates to the initiation of Metformin.  I do not have any significant orthostatic blood pressure drop.  I will equip him with an ambulatory EKG monitor, obtain an echocardiogram as well as nuclear stress testing.  I will plan to see him in follow-up over the next 4 to 5 weeks.      I spent 50 minutes caring for Izabela on this date of service. This time includes time spent by me in the following activities:preparing for the visit, reviewing tests, obtaining and/or reviewing a separately obtained history, performing a medically appropriate examination and/or evaluation , counseling and educating the patient/family/caregiver, ordering medications, tests, or procedures, referring and communicating with other health care professionals , documenting information in the medical record and care coordination  Follow Up   No follow-ups on file.  Patient was given instructions and counseling regarding his condition or for health maintenance advice. Please see specific information pulled into the AVS if appropriate.

## 2021-04-19 ENCOUNTER — HOSPITAL ENCOUNTER (OUTPATIENT)
Dept: CARDIOLOGY | Facility: HOSPITAL | Age: 81
Discharge: HOME | End: 2021-04-19

## 2021-04-19 VITALS
HEART RATE: 68 BPM | SYSTOLIC BLOOD PRESSURE: 112 MMHG | HEIGHT: 72 IN | DIASTOLIC BLOOD PRESSURE: 70 MMHG | WEIGHT: 208 LBS | BODY MASS INDEX: 28.17 KG/M2

## 2021-04-19 VITALS — HEIGHT: 72 IN | WEIGHT: 208 LBS | BODY MASS INDEX: 28.17 KG/M2

## 2021-04-19 DIAGNOSIS — I25.10 CORONARY ARTERY DISEASE INVOLVING NATIVE CORONARY ARTERY OF NATIVE HEART WITHOUT ANGINA PECTORIS: ICD-10-CM

## 2021-04-19 DIAGNOSIS — R55 NEAR SYNCOPE: ICD-10-CM

## 2021-04-19 DIAGNOSIS — Z95.1 S/P CABG (CORONARY ARTERY BYPASS GRAFT): ICD-10-CM

## 2021-04-19 DIAGNOSIS — Z95.5 H/O HEART ARTERY STENT: ICD-10-CM

## 2021-04-19 LAB
BH CV NUCLEAR PRIOR STUDY: 3
BH CV REST NUCLEAR ISOTOPE DOSE: 6.7 MCI
BH CV STRESS BP STAGE 1: NORMAL
BH CV STRESS BP STAGE 2: NORMAL
BH CV STRESS BP STAGE 3: NORMAL
BH CV STRESS DURATION MIN STAGE 1: 3
BH CV STRESS DURATION MIN STAGE 2: 3
BH CV STRESS DURATION MIN STAGE 3: 3
BH CV STRESS DURATION SEC STAGE 1: 0
BH CV STRESS DURATION SEC STAGE 2: 0
BH CV STRESS DURATION SEC STAGE 3: 0
BH CV STRESS GRADE STAGE 1: 10
BH CV STRESS GRADE STAGE 2: 12
BH CV STRESS GRADE STAGE 3: 14
BH CV STRESS HR STAGE 1: 99
BH CV STRESS HR STAGE 2: 118
BH CV STRESS HR STAGE 3: 135
BH CV STRESS METS STAGE 1: 5
BH CV STRESS METS STAGE 2: 7.5
BH CV STRESS METS STAGE 3: 10
BH CV STRESS NUCLEAR ISOTOPE DOSE: 23.3 MCI
BH CV STRESS PROTOCOL 1: NORMAL
BH CV STRESS RECOVERY BP: NORMAL MMHG
BH CV STRESS RECOVERY HR: 86 BPM
BH CV STRESS SPEED STAGE 1: 1.7
BH CV STRESS SPEED STAGE 2: 2.5
BH CV STRESS SPEED STAGE 3: 3.4
BH CV STRESS STAGE 1: 1
BH CV STRESS STAGE 2: 2
BH CV STRESS STAGE 3: 3
LV EF NUC BP: 46 %
MAXIMAL PREDICTED HEART RATE: 139 BPM
PERCENT MAX PREDICTED HR: 97.12 %
STRESS BASELINE BP: NORMAL MMHG
STRESS BASELINE HR: 66 BPM
STRESS PERCENT HR: 114 %
STRESS POST ESTIMATED WORKLOAD: 10 METS
STRESS POST EXERCISE DUR MIN: 9 MIN
STRESS POST EXERCISE DUR SEC: 0 SEC
STRESS POST PEAK BP: NORMAL MMHG
STRESS POST PEAK HR: 135 BPM
STRESS TARGET HR: 118 BPM

## 2021-04-19 PROCEDURE — 93018 CV STRESS TEST I&R ONLY: CPT | Performed by: INTERNAL MEDICINE

## 2021-04-19 PROCEDURE — 93016 CV STRESS TEST SUPVJ ONLY: CPT | Performed by: INTERNAL MEDICINE

## 2021-04-19 PROCEDURE — 0 TECHNETIUM TETROFOSMIN KIT: Performed by: INTERNAL MEDICINE

## 2021-04-19 PROCEDURE — 93306 TTE W/DOPPLER COMPLETE: CPT | Performed by: INTERNAL MEDICINE

## 2021-04-19 PROCEDURE — A9502 TC99M TETROFOSMIN: HCPCS | Performed by: INTERNAL MEDICINE

## 2021-04-19 PROCEDURE — 93017 CV STRESS TEST TRACING ONLY: CPT

## 2021-04-19 PROCEDURE — 78452 HT MUSCLE IMAGE SPECT MULT: CPT | Performed by: INTERNAL MEDICINE

## 2021-04-19 PROCEDURE — 93306 TTE W/DOPPLER COMPLETE: CPT

## 2021-04-19 PROCEDURE — 78452 HT MUSCLE IMAGE SPECT MULT: CPT

## 2021-04-19 RX ADMIN — TETROFOSMIN 1 DOSE: 1.38 INJECTION, POWDER, LYOPHILIZED, FOR SOLUTION INTRAVENOUS at 08:10

## 2021-04-19 RX ADMIN — TETROFOSMIN 1 DOSE: 1.38 INJECTION, POWDER, LYOPHILIZED, FOR SOLUTION INTRAVENOUS at 07:26

## 2021-04-20 LAB
AORTIC ARCH: 2.2 CM
BH CV ECHO MEAS - ACS: 1.9 CM
BH CV ECHO MEAS - AO MAX PG (FULL): 6.5 MMHG
BH CV ECHO MEAS - AO MAX PG: 9.1 MMHG
BH CV ECHO MEAS - AO MEAN PG (FULL): 3.2 MMHG
BH CV ECHO MEAS - AO MEAN PG: 4.5 MMHG
BH CV ECHO MEAS - AO ROOT AREA (BSA CORRECTED): 1.7
BH CV ECHO MEAS - AO ROOT AREA: 10.2 CM^2
BH CV ECHO MEAS - AO ROOT DIAM: 3.6 CM
BH CV ECHO MEAS - AO V2 MAX: 150.8 CM/SEC
BH CV ECHO MEAS - AO V2 MEAN: 97.3 CM/SEC
BH CV ECHO MEAS - AO V2 VTI: 34.9 CM
BH CV ECHO MEAS - AVA(I,A): 2 CM^2
BH CV ECHO MEAS - AVA(I,D): 2 CM^2
BH CV ECHO MEAS - AVA(V,A): 2.1 CM^2
BH CV ECHO MEAS - AVA(V,D): 2.1 CM^2
BH CV ECHO MEAS - BSA(HAYCOCK): 2.2 M^2
BH CV ECHO MEAS - BSA: 2.2 M^2
BH CV ECHO MEAS - BZI_BMI: 28.2 KILOGRAMS/M^2
BH CV ECHO MEAS - BZI_METRIC_HEIGHT: 182.9 CM
BH CV ECHO MEAS - BZI_METRIC_WEIGHT: 94.3 KG
BH CV ECHO MEAS - EDV(MOD-SP2): 84 ML
BH CV ECHO MEAS - EDV(MOD-SP4): 88 ML
BH CV ECHO MEAS - EDV(TEICH): 147.6 ML
BH CV ECHO MEAS - EF(CUBED): 76.3 %
BH CV ECHO MEAS - EF(MOD-BP): 57 %
BH CV ECHO MEAS - EF(MOD-SP2): 52.4 %
BH CV ECHO MEAS - EF(MOD-SP4): 59.1 %
BH CV ECHO MEAS - EF(TEICH): 67.8 %
BH CV ECHO MEAS - ESV(MOD-SP2): 40 ML
BH CV ECHO MEAS - ESV(MOD-SP4): 36 ML
BH CV ECHO MEAS - ESV(TEICH): 47.6 ML
BH CV ECHO MEAS - FS: 38.1 %
BH CV ECHO MEAS - IVS/LVPW: 1.1
BH CV ECHO MEAS - IVSD: 1.2 CM
BH CV ECHO MEAS - LAT PEAK E' VEL: 12.1 CM/SEC
BH CV ECHO MEAS - LV DIASTOLIC VOL/BSA (35-75): 40.6 ML/M^2
BH CV ECHO MEAS - LV MASS(C)D: 244.3 GRAMS
BH CV ECHO MEAS - LV MASS(C)DI: 112.8 GRAMS/M^2
BH CV ECHO MEAS - LV MAX PG: 2.6 MMHG
BH CV ECHO MEAS - LV MEAN PG: 1.3 MMHG
BH CV ECHO MEAS - LV SYSTOLIC VOL/BSA (12-30): 16.6 ML/M^2
BH CV ECHO MEAS - LV V1 MAX: 80.1 CM/SEC
BH CV ECHO MEAS - LV V1 MEAN: 53.2 CM/SEC
BH CV ECHO MEAS - LV V1 VTI: 17.8 CM
BH CV ECHO MEAS - LVIDD: 5.5 CM
BH CV ECHO MEAS - LVIDS: 3.4 CM
BH CV ECHO MEAS - LVLD AP2: 6.8 CM
BH CV ECHO MEAS - LVLD AP4: 7.1 CM
BH CV ECHO MEAS - LVLS AP2: 6.2 CM
BH CV ECHO MEAS - LVLS AP4: 6.3 CM
BH CV ECHO MEAS - LVOT AREA (M): 4.2 CM^2
BH CV ECHO MEAS - LVOT AREA: 4 CM^2
BH CV ECHO MEAS - LVOT DIAM: 2.3 CM
BH CV ECHO MEAS - LVPWD: 1 CM
BH CV ECHO MEAS - MED PEAK E' VEL: 8.8 CM/SEC
BH CV ECHO MEAS - MR MAX PG: 44.7 MMHG
BH CV ECHO MEAS - MR MAX VEL: 334.1 CM/SEC
BH CV ECHO MEAS - MV A DUR: 0.13 SEC
BH CV ECHO MEAS - MV A MAX VEL: 41.6 CM/SEC
BH CV ECHO MEAS - MV DEC SLOPE: 219.8 CM/SEC^2
BH CV ECHO MEAS - MV DEC TIME: 0.24 SEC
BH CV ECHO MEAS - MV E MAX VEL: 59.1 CM/SEC
BH CV ECHO MEAS - MV E/A: 1.4
BH CV ECHO MEAS - MV MAX PG: 2.4 MMHG
BH CV ECHO MEAS - MV MEAN PG: 1.1 MMHG
BH CV ECHO MEAS - MV P1/2T MAX VEL: 56.6 CM/SEC
BH CV ECHO MEAS - MV P1/2T: 75.4 MSEC
BH CV ECHO MEAS - MV V2 MAX: 78 CM/SEC
BH CV ECHO MEAS - MV V2 MEAN: 50 CM/SEC
BH CV ECHO MEAS - MV V2 VTI: 22 CM
BH CV ECHO MEAS - MVA P1/2T LCG: 3.9 CM^2
BH CV ECHO MEAS - MVA(P1/2T): 2.9 CM^2
BH CV ECHO MEAS - MVA(VTI): 3.3 CM^2
BH CV ECHO MEAS - PA MAX PG (FULL): 2.4 MMHG
BH CV ECHO MEAS - PA MAX PG: 3.5 MMHG
BH CV ECHO MEAS - PA V2 MAX: 93.7 CM/SEC
BH CV ECHO MEAS - PULM A REVS DUR: 0.13 SEC
BH CV ECHO MEAS - PULM A REVS VEL: 16.7 CM/SEC
BH CV ECHO MEAS - PULM DIAS VEL: 38.6 CM/SEC
BH CV ECHO MEAS - PULM S/D: 1.4
BH CV ECHO MEAS - PULM SYS VEL: 52.3 CM/SEC
BH CV ECHO MEAS - PVA(V,A): 2.1 CM^2
BH CV ECHO MEAS - PVA(V,D): 2.1 CM^2
BH CV ECHO MEAS - QP/QS: 0.61
BH CV ECHO MEAS - RAP SYSTOLE: 15 MMHG
BH CV ECHO MEAS - RV MAX PG: 1.1 MMHG
BH CV ECHO MEAS - RV MEAN PG: 0.58 MMHG
BH CV ECHO MEAS - RV V1 MAX: 52.7 CM/SEC
BH CV ECHO MEAS - RV V1 MEAN: 34.7 CM/SEC
BH CV ECHO MEAS - RV V1 VTI: 11.5 CM
BH CV ECHO MEAS - RVOT AREA: 3.8 CM^2
BH CV ECHO MEAS - RVOT DIAM: 2.2 CM
BH CV ECHO MEAS - RVSP: 48 MMHG
BH CV ECHO MEAS - SI(AO): 164.1 ML/M^2
BH CV ECHO MEAS - SI(CUBED): 58.7 ML/M^2
BH CV ECHO MEAS - SI(LVOT): 33 ML/M^2
BH CV ECHO MEAS - SI(MOD-SP2): 20.3 ML/M^2
BH CV ECHO MEAS - SI(MOD-SP4): 24 ML/M^2
BH CV ECHO MEAS - SI(TEICH): 46.2 ML/M^2
BH CV ECHO MEAS - SUP REN AO DIAM: 1.7 CM
BH CV ECHO MEAS - SV(AO): 355.5 ML
BH CV ECHO MEAS - SV(CUBED): 127.2 ML
BH CV ECHO MEAS - SV(LVOT): 71.5 ML
BH CV ECHO MEAS - SV(MOD-SP2): 44 ML
BH CV ECHO MEAS - SV(MOD-SP4): 52 ML
BH CV ECHO MEAS - SV(RVOT): 43.5 ML
BH CV ECHO MEAS - SV(TEICH): 100 ML
BH CV ECHO MEAS - TAPSE (>1.6): 2.1 CM
BH CV ECHO MEAS - TR MAX VEL: 284.5 CM/SEC
BH CV ECHO MEASUREMENTS AVERAGE E/E' RATIO: 5.66
BH CV XLRA - RV BASE: 3.4 CM
BH CV XLRA - RV LENGTH: 6.2 CM
BH CV XLRA - RV MID: 2.4 CM
BH CV XLRA - TDI S': 8.5 CM/SEC
LEFT ATRIUM VOLUME INDEX: 26 ML/M2
MAXIMAL PREDICTED HEART RATE: 139 BPM
SINUS: 3.3 CM
STJ: 2.9 CM
STRESS TARGET HR: 118 BPM

## 2021-04-26 ENCOUNTER — OFFICE VISIT (OUTPATIENT)
Dept: GASTROENTEROLOGY | Facility: CLINIC | Age: 81
End: 2021-04-26

## 2021-04-26 ENCOUNTER — TELEPHONE (OUTPATIENT)
Dept: GASTROENTEROLOGY | Facility: CLINIC | Age: 81
End: 2021-04-26

## 2021-04-26 VITALS — WEIGHT: 211.4 LBS | BODY MASS INDEX: 28.63 KG/M2 | HEIGHT: 72 IN | TEMPERATURE: 97 F

## 2021-04-26 DIAGNOSIS — D12.6 ADENOMATOUS POLYP OF COLON, UNSPECIFIED PART OF COLON: Primary | ICD-10-CM

## 2021-04-26 PROCEDURE — 99203 OFFICE O/P NEW LOW 30 MIN: CPT | Performed by: INTERNAL MEDICINE

## 2021-04-26 NOTE — TELEPHONE ENCOUNTER
----- Message from Rob POSADA MD sent at 4/26/2021  1:25 PM EDT -----  Request clearance by his cardiologist Dr. Yosef Rose to hold Xarelto, aspirin prior to colonoscopic examination

## 2021-04-26 NOTE — TELEPHONE ENCOUNTER
Message to Dr Rose checking if pt may hold xarelto for 48 hours prior to colonoscopy scheduled for 6/7.     Also requesting instructions if aspirin should be held - if so, for how many days.

## 2021-04-26 NOTE — PROGRESS NOTES
Chief Complaint   Patient presents with   • Colon Polyps        Izabela Martinez is a  80 y.o. male here for a follow up visit for history of adenomatous polyps  HPI this 80-year-old white male patient of Dr. Ze Yost and Dr. Yosef Rose presents in follow-up since last colonoscoped in February 2018.  An adenomatous polyp was removed at that time.  He warrants follow-up examination at this point.  He denies any current GI related complaints.  He is on Xarelto and aspirin and will need to be cleared by his cardiologist before considering examination.  He is actually undergoing work-up through that office at present.    Past Medical History:   Diagnosis Date   • Allergic cough    • Anemia    • Arteriosclerosis of coronary artery    • Benign enlargement of prostate    • Benign essential hypertension    • Blister of foot, left    • BMI 30.0-30.9,adult    • CAD (coronary artery disease) 6/11/2019   • Cough    • Diabetes mellitus (CMS/HCC) 6/11/2019   • Disorder of skin    • Essential hypertension 6/11/2019    Benign   • Essential hypertriglyceridemia    • H/O echocardiogram 6/11/2019    3/21/14 - Mild concentric LVG.  EF 55-60%.  Mild MR.  Moderate CO.     • H/O heart artery stent 6/11/2019    3/20/14 - PCI to the SVG of the RCA with a RESOLUTE drug eluting stent   • Hammer toe     right second-fourth toes    • History of cancer    • History of cardiac cath 6/11/2019 4/17/17 - Severe three vessel obstructive CAD.  Severely diseased vein grafts (SVG to ?OM, and SVG to RCA) that are the source of the patient's unstable angina..  EF 65%.   • History of diagnostic ultrasound 6/11/2019 4/18/17 - CAROTID ULTRASOUND - No evidence of significant carotid stenosis.  NASCET criteria was utilized.   • History of echocardiogram    • History of nuclear stress test    • Hyperlipidemia LDL goal <100 6/11/2019   • Hypertrophy of prostate     Benign   • Hypocalcemia    • Hypokalemia    • Inability to attain erection    •  Medicare annual wellness visit, subsequent    • JAYCOB on CPAP    • Right retinal detachment    • S/P CABG (coronary artery bypass graft) 6/11/2019    Redo CABG 4/2017   • S/P cardiac catheterization    • S/P coronary artery stent placement    • Thrombocytopenia (CMS/HCC)        Current Outpatient Medications   Medication Sig Dispense Refill   • aspirin 81 MG EC tablet Take 81 mg by mouth Daily.     • atorvastatin (LIPITOR) 20 MG tablet Take 1 tablet by mouth Daily. 90 tablet 3   • gemfibrozil (LOPID) 600 MG tablet Take 1 tablet by mouth 2 (Two) Times a Day Before Meals. 180 tablet 3   • metFORMIN ER (GLUCOPHAGE-XR) 500 MG 24 hr tablet Take 2 tablets daily with evening meal. 60 tablet 3   • Multiple Vitamin (MULTI-VITAMIN DAILY PO) Take  by mouth.     • nitroglycerin (NITROSTAT) 0.4 MG SL tablet Place 1 tablet under the tongue Every 5 (Five) Minutes As Needed for Chest Pain. Take no more than 3 doses in 15 minutes. 25 tablet 3   • rivaroxaban (XARELTO) 20 MG tablet Take 1 tablet by mouth Daily. 90 tablet 3   • vitamin C (ASCORBIC ACID) 500 MG tablet Take 500 mg by mouth Daily.     • vitamin E 400 UNIT capsule Take 400 Units by mouth Daily.       No current facility-administered medications for this visit.       PRN Meds:.    Allergies   Allergen Reactions   • Demerol [Meperidine] Dizziness       Social History     Socioeconomic History   • Marital status:      Spouse name: Not on file   • Number of children: Not on file   • Years of education: Not on file   • Highest education level: Not on file   Tobacco Use   • Smoking status: Never Smoker   • Smokeless tobacco: Never Used   Substance and Sexual Activity   • Alcohol use: No   • Drug use: No   • Sexual activity: Defer       Family History   Problem Relation Age of Onset   • Cancer Mother    • Pancreatic cancer Mother    • Diabetes Brother    • Heart disease Brother    • Heart disease Maternal Grandmother    • Heart disease Paternal Grandfather        Review of  Systems   Constitutional: Negative for activity change, appetite change, fatigue and unexpected weight change.   HENT: Negative for congestion, facial swelling, sore throat, trouble swallowing and voice change.    Eyes: Negative for photophobia and visual disturbance.   Respiratory: Negative for cough and choking.    Cardiovascular: Negative for chest pain.   Gastrointestinal: Negative for abdominal distention, abdominal pain, anal bleeding, blood in stool, constipation, diarrhea, nausea, rectal pain and vomiting.   Endocrine: Negative for polyphagia.   Musculoskeletal: Negative for arthralgias, gait problem and joint swelling.   Skin: Negative for color change, pallor and rash.   Allergic/Immunologic: Negative for food allergies.   Neurological: Negative for speech difficulty and headaches.   Hematological: Does not bruise/bleed easily.   Psychiatric/Behavioral: Negative for agitation, confusion and sleep disturbance.       Vitals:    04/26/21 1253   Temp: 97 °F (36.1 °C)       Physical Exam  Constitutional:       Appearance: He is well-developed.   HENT:      Head: Normocephalic.   Eyes:      Conjunctiva/sclera: Conjunctivae normal.   Cardiovascular:      Rate and Rhythm: Normal rate and regular rhythm.   Pulmonary:      Breath sounds: Normal breath sounds.   Abdominal:      General: Bowel sounds are normal.      Palpations: Abdomen is soft.   Musculoskeletal:         General: Normal range of motion.      Cervical back: Normal range of motion.   Skin:     General: Skin is warm and dry.   Neurological:      Mental Status: He is alert and oriented to person, place, and time.   Psychiatric:         Behavior: Behavior normal.         ASSESSMENT  #1 adenomatous polyps: Warrants follow-up examination  #2 anticoagulant therapy: We will request clearance by cardiology      PLAN  Schedule colonoscopy  Clearance for adjustment of anticoagulants prior to examination      ICD-10-CM ICD-9-CM   1. Adenomatous polyp of colon,  unspecified part of colon  D12.6 211.3

## 2021-04-27 ENCOUNTER — OFFICE VISIT (OUTPATIENT)
Dept: FAMILY MEDICINE CLINIC | Facility: CLINIC | Age: 81
End: 2021-04-27

## 2021-04-27 VITALS
WEIGHT: 210 LBS | BODY MASS INDEX: 28.44 KG/M2 | HEART RATE: 76 BPM | DIASTOLIC BLOOD PRESSURE: 62 MMHG | OXYGEN SATURATION: 95 % | HEIGHT: 72 IN | SYSTOLIC BLOOD PRESSURE: 118 MMHG | TEMPERATURE: 97.7 F

## 2021-04-27 DIAGNOSIS — H81.90 VESTIBULAR DIZZINESS: Primary | ICD-10-CM

## 2021-04-27 PROCEDURE — 99213 OFFICE O/P EST LOW 20 MIN: CPT | Performed by: INTERNAL MEDICINE

## 2021-04-27 NOTE — PROGRESS NOTES
Concha Martinez is a 80 y.o. male.     Chief Complaint   Patient presents with   • Dizziness       History of Present Illness   Answers for HPI/ROS submitted by the patient on 4/20/2021  What is the primary reason for your visit?: Other  Please describe your symptoms.: Dizziness when he swings his head in a different direction.  Have you had these symptoms before?: No  How long have you been having these symptoms?: 1-2 weeks  Please list any medications you are currently taking for this condition.: none  Please describe any probable cause for these symptoms. : low blood pressure?? Sinus??    Patient with 1-2 weeks of dizziness that was worse with turning or moving his head around or change position.  No dizziness, CP, SOA, weakness, numbness, palpitations.  Has not had any dizziness or issues for the last 2-3 weeks and no problems now.    history of diabetes.  Patient states to have been compliant with medications.  Blood sugar monitoring - patient states has not been checked.  No episodes of hypoglycemia, nausea, vomiting, new rashes, syncope or other issues.  Denies any difficulties and not on any medications.  Last A1c on 7/22/2020 of 7.2%.     History of high cholesterol.  Currently, has been feeling well without any myalgias, muscle aches, weakness, numbness, chest pain, short of breath or other issues.  Currently, is adherent with medication regimen of atorvastatin 20 mg and gemfibrozil 600 mg. and denies medication side effects. Is due for lab follow-up.     Follow-up for history of hypertension.  Currently, has been feeling well and asymptomatic without any headaches, vision changes, cough, chest pain, shortness of breath, swelling, focal neurologic deficit, memory loss or syncope.  Has not been taking any medications.  Dr Rose discontinued the ramipril secondary to hypotension and orthostasis that has now resolved.   Home blood pressure has been running good.      Has seen cardiology and  had stress test, echo and holter monitor with no significant findings and no changes made.    The following portions of the patient's history were reviewed and updated as appropriate: allergies, current medications, past family history, past medical history, past social history, past surgical history and problem list.    Depression Screen:  PHQ-2/PHQ-9 Depression Screening 7/22/2020   Little interest or pleasure in doing things 0   Feeling down, depressed, or hopeless 0   Trouble falling or staying asleep, or sleeping too much 0   Feeling tired or having little energy 0   Poor appetite or overeating 0   Feeling bad about yourself - or that you are a failure or have let yourself or your family down 0   Trouble concentrating on things, such as reading the newspaper or watching television 0   Moving or speaking so slowly that other people could have noticed. Or the opposite - being so fidgety or restless that you have been moving around a lot more than usual 0   Thoughts that you would be better off dead, or of hurting yourself in some way 0   Total Score 0       Past Medical History:   Diagnosis Date   • Allergic cough    • Anemia    • Arteriosclerosis of coronary artery    • Benign enlargement of prostate    • Benign essential hypertension    • Blister of foot, left    • BMI 30.0-30.9,adult    • CAD (coronary artery disease) 6/11/2019   • Cough    • Diabetes mellitus (CMS/[facility]) 6/11/2019   • Disorder of skin    • Essential hypertension 6/11/2019    Benign   • Essential hypertriglyceridemia    • H/O echocardiogram 6/11/2019    3/21/14 - Mild concentric LVG.  EF 55-60%.  Mild MR.  Moderate OR.     • H/O heart artery stent 6/11/2019    3/20/14 - PCI to the SVG of the RCA with a RESOLUTE drug eluting stent   • Hammer toe     right second-fourth toes    • History of cancer    • History of cardiac cath 6/11/2019 4/17/17 - Severe three vessel obstructive CAD.  Severely diseased vein grafts (SVG to ?OM, and SVG to RCA)  that are the source of the patient's unstable angina..  EF 65%.   • History of diagnostic ultrasound 6/11/2019 4/18/17 - CAROTID ULTRASOUND - No evidence of significant carotid stenosis.  NASCET criteria was utilized.   • History of echocardiogram    • History of nuclear stress test    • Hyperlipidemia LDL goal <100 6/11/2019   • Hypertrophy of prostate     Benign   • Hypocalcemia    • Hypokalemia    • Inability to attain erection    • Medicare annual wellness visit, subsequent    • JAYCOB on CPAP    • Right retinal detachment    • S/P CABG (coronary artery bypass graft) 6/11/2019    Redo CABG 4/2017   • S/P cardiac catheterization    • S/P coronary artery stent placement    • Thrombocytopenia (CMS/HCC)        Past Surgical History:   Procedure Laterality Date   • APPENDECTOMY     • CATARACT EXTRACTION  2012 2011 and 2012   • COLONOSCOPY      polyps- recheck 2014. polyps repeat every 3 years   • CORONARY ARTERY BYPASS GRAFT      4 vessel 4/21/17. 4 vessel- Left internal mammary to LAD, saphenous vein graft to diagonal branch, marginal brance and the PDA branch.    • FOOT SURGERY      right heel   • OTHER SURGICAL HISTORY      Cath stent placement    • RETINAL DETACHMENT REPAIR         Family History   Problem Relation Age of Onset   • Cancer Mother    • Pancreatic cancer Mother    • Diabetes Brother    • Heart disease Brother    • Heart disease Maternal Grandmother    • Heart disease Paternal Grandfather        Social History     Socioeconomic History   • Marital status:      Spouse name: Not on file   • Number of children: Not on file   • Years of education: Not on file   • Highest education level: Not on file   Tobacco Use   • Smoking status: Never Smoker   • Smokeless tobacco: Never Used   Substance and Sexual Activity   • Alcohol use: No   • Drug use: No   • Sexual activity: Defer       Current Outpatient Medications   Medication Sig Dispense Refill   • aspirin 81 MG EC tablet Take 81 mg by mouth  Daily.     • atorvastatin (LIPITOR) 20 MG tablet Take 1 tablet by mouth Daily. 90 tablet 3   • gemfibrozil (LOPID) 600 MG tablet Take 1 tablet by mouth 2 (Two) Times a Day Before Meals. 180 tablet 3   • metFORMIN ER (GLUCOPHAGE-XR) 500 MG 24 hr tablet Take 2 tablets daily with evening meal. 60 tablet 3   • Multiple Vitamin (MULTI-VITAMIN DAILY PO) Take  by mouth.     • nitroglycerin (NITROSTAT) 0.4 MG SL tablet Place 1 tablet under the tongue Every 5 (Five) Minutes As Needed for Chest Pain. Take no more than 3 doses in 15 minutes. 25 tablet 3   • rivaroxaban (XARELTO) 20 MG tablet Take 1 tablet by mouth Daily. 90 tablet 3   • vitamin C (ASCORBIC ACID) 500 MG tablet Take 500 mg by mouth Daily.     • vitamin E 400 UNIT capsule Take 400 Units by mouth Daily.       No current facility-administered medications for this visit.       Review of Systems   Constitutional: Negative for activity change, appetite change, fatigue, fever, unexpected weight gain and unexpected weight loss.   HENT: Negative for nosebleeds, rhinorrhea, trouble swallowing and voice change.    Eyes: Negative for visual disturbance.   Respiratory: Negative for cough, chest tightness, shortness of breath and wheezing.    Cardiovascular: Negative for chest pain, palpitations and leg swelling.   Gastrointestinal: Negative for abdominal pain, blood in stool, constipation, diarrhea, nausea, vomiting, GERD and indigestion.   Genitourinary: Negative for dysuria, frequency and hematuria.   Musculoskeletal: Negative for arthralgias, back pain and myalgias.   Skin: Negative for rash and bruise.   Neurological: Positive for dizziness. Negative for tremors, weakness, light-headedness, numbness, headache and memory problem.   Hematological: Negative for adenopathy. Does not bruise/bleed easily.   Psychiatric/Behavioral: Negative for sleep disturbance and depressed mood. The patient is not nervous/anxious.        Objective   /62 (BP Location: Left arm, Patient  "Position: Sitting, Cuff Size: Adult)   Pulse 76   Temp 97.7 °F (36.5 °C) (Temporal)   Ht 182.9 cm (72.01\")   Wt 95.3 kg (210 lb)   SpO2 95%   BMI 28.47 kg/m²     Physical Exam  Vitals and nursing note reviewed.   Constitutional:       General: He is not in acute distress.     Appearance: He is well-developed. He is not diaphoretic.   HENT:      Head: Normocephalic and atraumatic.      Right Ear: External ear normal.      Left Ear: External ear normal.      Nose: Nose normal.   Eyes:      Conjunctiva/sclera: Conjunctivae normal.      Pupils: Pupils are equal, round, and reactive to light.   Neck:      Thyroid: No thyromegaly.      Trachea: No tracheal deviation.   Cardiovascular:      Rate and Rhythm: Normal rate and regular rhythm.      Heart sounds: Normal heart sounds. No murmur heard.   No friction rub. No gallop.    Pulmonary:      Effort: Pulmonary effort is normal. No respiratory distress.      Breath sounds: Normal breath sounds.   Abdominal:      General: Bowel sounds are normal.      Palpations: Abdomen is soft. There is no mass.      Tenderness: There is no abdominal tenderness. There is no guarding.   Musculoskeletal:         General: Normal range of motion.      Cervical back: Normal range of motion and neck supple.   Lymphadenopathy:      Cervical: No cervical adenopathy.   Skin:     General: Skin is warm and dry.      Capillary Refill: Capillary refill takes less than 2 seconds.      Findings: No rash.   Neurological:      Mental Status: He is alert and oriented to person, place, and time.      Motor: No abnormal muscle tone.      Deep Tendon Reflexes: Reflexes normal.   Psychiatric:         Behavior: Behavior normal.         Thought Content: Thought content normal.         Judgment: Judgment normal.         Recent Results (from the past 2016 hour(s))   Stress Test With Myocardial Perfusion One Day    Collection Time: 04/19/21  8:55 AM   Result Value Ref Range    BH CV REST NUCLEAR ISOTOPE DOSE " 6.7 mCi    Nuclear Prior Study 3     BH CV STRESS NUCLEAR ISOTOPE DOSE 23.3 mCi    BH CV STRESS PROTOCOL 1 Mike     Stage 1 1     HR Stage 1 99     BP Stage 1 158/72     Duration Min Stage 1 3     Duration Sec Stage 1 0     Grade Stage 1 10     Speed Stage 1 1.7     BH CV STRESS METS STAGE 1 5     Stage 2 2     HR Stage 2 118     BP Stage 2 168/70     Duration Min Stage 2 3     Duration Sec Stage 2 0     Grade Stage 2 12     Speed Stage 2 2.5     BH CV STRESS METS STAGE 2 7.5     Stage 3 3     HR Stage 3 135     BP Stage 3 176/80     Duration Min Stage 3 3     Duration Sec Stage 3 0     Grade Stage 3 14     Speed Stage 3 3.4     BH CV STRESS METS STAGE 3 10.0     Baseline HR 66 bpm    Baseline /88 mmHg    Peak  bpm    Percent Max Pred HR 97.12 %    Percent Target  %    Peak /80 mmHg    Recovery HR 86 bpm    Recovery /80 mmHg    Target HR (85%) 118 bpm    Max. Pred. HR (100%) 139 bpm    Exercise duration (min) 9 min    Exercise duration (sec) 0 sec    Estimated workload 10.0 METS    Nuc Stress EF 46 %   Adult Transthoracic Echo Complete W/ Cont if Necessary Per Protocol    Collection Time: 04/19/21  9:40 AM   Result Value Ref Range    BSA 2.2 m^2    IVSd 1.2 cm    LVIDd 5.5 cm    LVIDs 3.4 cm    LVPWd 1.0 cm    IVS/LVPW 1.1     FS 38.1 %    EDV(Teich) 147.6 ml    ESV(Teich) 47.6 ml    EF(Teich) 67.8 %    EF(cubed) 76.3 %    LV mass(C)d 244.3 grams    LV mass(C)dI 112.8 grams/m^2    SV(Teich) 100.0 ml    SI(Teich) 46.2 ml/m^2    SV(cubed) 127.2 ml    SI(cubed) 58.7 ml/m^2    Ao root diam 3.6 cm    Ao root area 10.2 cm^2    ACS 1.9 cm    LVOT diam 2.3 cm    LVOT area 4.0 cm^2    LVOT area(traced) 4.2 cm^2    RVOT diam 2.2 cm    RVOT area 3.8 cm^2    LVLd ap4 7.1 cm    EDV(MOD-sp4) 88.0 ml    LVLs ap4 6.3 cm    ESV(MOD-sp4) 36.0 ml    EF(MOD-sp4) 59.1 %    LVLd ap2 6.8 cm    EDV(MOD-sp2) 84.0 ml    LVLs ap2 6.2 cm    ESV(MOD-sp2) 40.0 ml    EF(MOD-sp2) 52.4 %    SV(MOD-sp4) 52.0 ml     SI(MOD-sp4) 24.0 ml/m^2    SV(MOD-sp2) 44.0 ml    SI(MOD-sp2) 20.3 ml/m^2    Ao root area (BSA corrected) 1.7     LV Matthew Vol (BSA corrected) 40.6 ml/m^2    LV Sys Vol (BSA corrected) 16.6 ml/m^2    TAPSE (>1.6) 2.1 cm    EF(MOD-bp) 57.0 %    MV A dur 0.13 sec    MV E max jayro 59.1 cm/sec    MV A max jayro 41.6 cm/sec    MV E/A 1.4     MV V2 max 78.0 cm/sec    MV max PG 2.4 mmHg    MV V2 mean 50.0 cm/sec    MV mean PG 1.1 mmHg    MV V2 VTI 22.0 cm    MVA(VTI) 3.3 cm^2    MV P1/2t max jayro 56.6 cm/sec    MV P1/2t 75.4 msec    MVA(P1/2t) 2.9 cm^2    MV dec slope 219.8 cm/sec^2    MV dec time 0.24 sec    Ao pk jayro 150.8 cm/sec    Ao max PG 9.1 mmHg    Ao max PG (full) 6.5 mmHg    Ao V2 mean 97.3 cm/sec    Ao mean PG 4.5 mmHg    Ao mean PG (full) 3.2 mmHg    Ao V2 VTI 34.9 cm    CASH(I,A) 2.0 cm^2    CASH(I,D) 2.0 cm^2    CASH(V,A) 2.1 cm^2    CASH(V,D) 2.1 cm^2    LV V1 max PG 2.6 mmHg    LV V1 mean PG 1.3 mmHg    LV V1 max 80.1 cm/sec    LV V1 mean 53.2 cm/sec    LV V1 VTI 17.8 cm    MR max jayro 334.1 cm/sec    MR max PG 44.7 mmHg    SV(Ao) 355.5 ml    SI(Ao) 164.1 ml/m^2    SV(LVOT) 71.5 ml    SV(RVOT) 43.5 ml    SI(LVOT) 33.0 ml/m^2    PA V2 max 93.7 cm/sec    PA max PG 3.5 mmHg    PA max PG (full) 2.4 mmHg    BH CV ECHO LESA - PVA(V,A) 2.1 cm^2    BH CV ECHO LESA - PVA(V,D) 2.1 cm^2    RV V1 max PG 1.1 mmHg    RV V1 mean PG 0.58 mmHg    RV V1 max 52.7 cm/sec    RV V1 mean 34.7 cm/sec    RV V1 VTI 11.5 cm    TR max jayro 284.5 cm/sec    Pulm Sys Jayro 52.3 cm/sec    Pulm Matthew Jayro 38.6 cm/sec    Pulm S/D 1.4     Qp/Qs 0.61     Pulm A Revs Dur 0.13 sec    Pulm A Revs Jayro 16.7 cm/sec    MVA P1/2T LCG 3.9 cm^2    RV Base 3.4 cm    RV Length 6.2 cm    RV Mid 2.4 cm    Lat Peak E' Jayro 12.1 cm/sec    Med Peak E' Jayro 8.8 cm/sec    RV S' 8.5 cm/sec     CV ECHO LESA - BZI_BMI 28.2 kilograms/m^2     CV ECHO LESA - BSA(HAYCOCK) 2.2 m^2     CV ECHO LESA - BZI_METRIC_WEIGHT 94.3 kg     CV ECHO LESA - BZI_METRIC_HEIGHT 182.9 cm    Avg  E/e' ratio 5.66     Sinus 3.3 cm    STJ 2.9 cm    Aortic arch 2.2 cm    LA Volume Index 26.0 mL/m2    RAP systole 15 mmHg    RVSP(TR) 48 mmHg    Abdo Ao Diam 1.7 cm    Target HR (85%) 118 bpm    Max. Pred. HR (100%) 139 bpm     Assessment/Plan   Diagnoses and all orders for this visit:    1. Vestibular dizziness (Primary)      No new treatment or changes needed at this time.  Observe and if issues arise then follow up.  Will follow up in 3 months for medicare wellness and labs.         · COVID-19 Precautions - Patient was compliant in wearing a mask. When I saw the patient, I used appropriate personal protective equipment (PPE) including mask and eye shield (standard procedure).  Additionally, I used gown and gloves if indicated.  Hand hygiene was completed before and after seeing the patient.  · Dictated utilizing Dragon Dictation

## 2021-04-27 NOTE — TELEPHONE ENCOUNTER
That is is whoever is performing the colonoscopy if they want aspirin held.  They do it should be held 7 days prior to procedure.

## 2021-04-28 NOTE — TELEPHONE ENCOUNTER
Okay to hold aspirin for 5 days and the Xarelto for 2 days.  MVG.     **VM to pt with request to contact office (mobile # not in service).  Karen Parada RN.

## 2021-04-30 NOTE — TELEPHONE ENCOUNTER
Call to pt.  Advise per Dr Call note.  Stress to hold xarelto 2 days and asa 5 days - (differing lengths).   Pt verb understanding

## 2021-05-04 PROBLEM — E78.5 HYPERLIPIDEMIA LDL GOAL <100: Chronic | Status: RESOLVED | Noted: 2019-06-11 | Resolved: 2021-05-04

## 2021-05-05 ENCOUNTER — OFFICE VISIT (OUTPATIENT)
Dept: CARDIOLOGY | Facility: CLINIC | Age: 81
End: 2021-05-05

## 2021-05-05 VITALS
HEIGHT: 72 IN | HEART RATE: 71 BPM | SYSTOLIC BLOOD PRESSURE: 120 MMHG | BODY MASS INDEX: 28.66 KG/M2 | OXYGEN SATURATION: 96 % | WEIGHT: 211.6 LBS | RESPIRATION RATE: 18 BRPM | DIASTOLIC BLOOD PRESSURE: 68 MMHG

## 2021-05-05 DIAGNOSIS — R42 ORTHOSTATIC DIZZINESS: ICD-10-CM

## 2021-05-05 DIAGNOSIS — I10 ESSENTIAL HYPERTENSION: ICD-10-CM

## 2021-05-05 DIAGNOSIS — Z95.5 H/O HEART ARTERY STENT: ICD-10-CM

## 2021-05-05 DIAGNOSIS — Z95.1 S/P CABG (CORONARY ARTERY BYPASS GRAFT): ICD-10-CM

## 2021-05-05 DIAGNOSIS — E78.1 ESSENTIAL HYPERTRIGLYCERIDEMIA: ICD-10-CM

## 2021-05-05 DIAGNOSIS — I25.10 CORONARY ARTERY DISEASE INVOLVING NATIVE CORONARY ARTERY OF NATIVE HEART WITHOUT ANGINA PECTORIS: Primary | ICD-10-CM

## 2021-05-05 DIAGNOSIS — E78.5 HYPERLIPIDEMIA LDL GOAL <70: ICD-10-CM

## 2021-05-05 DIAGNOSIS — E11.9 TYPE 2 DIABETES MELLITUS WITHOUT COMPLICATION, WITHOUT LONG-TERM CURRENT USE OF INSULIN (HCC): ICD-10-CM

## 2021-05-05 PROCEDURE — 99214 OFFICE O/P EST MOD 30 MIN: CPT | Performed by: INTERNAL MEDICINE

## 2021-05-05 RX ORDER — NITROGLYCERIN 0.4 MG/1
0.4 TABLET SUBLINGUAL
Qty: 25 TABLET | Refills: 3 | Status: SHIPPED | OUTPATIENT
Start: 2021-05-05

## 2021-05-05 NOTE — PROGRESS NOTES
"Chief Complaint  Follow-up (CAD)    Subjective    History of Present Illness      I saw Izabela Martinez today for continued cardiovascular care.  He is a pleasant 80-year-old male who observes the CDC guidelines and has received his COVID-19 vaccine.  Mr. Martinez has known coronary heart disease status post redo coronary artery bypass surgery 4/21/2017.  At the time of his last visit for 921 he reported recurrent unexplained dizziness.  He did not report significant chest pain pressure tightness.  He had his baseline dyspnea on exertion.He underwent echocardiogram 4/19/2021 which revealed left ventricular ejection fraction 57% mild concentric left ventricular hypertrophy, there was moderate pulmonary hypertension with right ventricular systolic pressure 45 to 55 mmHg and positive right to left atrial shunt with Valsalva maneuvers.  He has been maintained on long-term anticoagulation due to coronary thrombus while visiting in [place].  Nuclear stress test on 4/19/2021 did not reveal any evidence of ischemia.  14-day ambulatory monitor is pending.  Mr. Martinez is in good spirits today and remains active.  His dizziness has resolved.  He is free of chest discomfort and does not report any significant respiratory insufficiency.  I have reviewed his studies with him.  Objective   Vital Signs:   /68   Pulse 71   Resp 18   Ht 182.9 cm (72.01\")   Wt 96 kg (211 lb 9.6 oz)   SpO2 96%   BMI 28.69 kg/m²     Constitutional:       Appearance: Well-developed.   Eyes:      Conjunctiva/sclera: Conjunctivae normal.      Pupils: Pupils are equal, round, and reactive to light.   HENT:      Head: Normocephalic and atraumatic.   Neck:      Thyroid: No thyromegaly.   Pulmonary:      Effort: Pulmonary effort is normal. No respiratory distress.      Breath sounds: Normal breath sounds. No wheezing. No rales.   Cardiovascular:      Normal rate. Regular rhythm.      S4 Gallop. No S3 gallop. Midsystolic click click.   Edema:     " Peripheral edema absent.   Abdominal:      General: Bowel sounds are normal. There is no distension.      Palpations: Abdomen is soft. There is no abdominal mass.      Tenderness: There is no abdominal tenderness.   Musculoskeletal: Normal range of motion.      Cervical back: Neck supple. Skin:     General: Skin is warm and dry.      Findings: No erythema.   Neurological:      Mental Status: Alert and oriented to person, place, and time.         Result Review :     Common labs    Common Labsle 7/22/20 7/22/20 7/22/20 1/20/21 1/20/21 1/20/21    0947 0947 0947 0911 0911 0911   Glucose 126 (A)    135 (A)    BUN 21    25 (A)    Creatinine 1.04    1.06    eGFR Non  Am 69    67    eGFR African Am 83    81    Sodium 143    140    Potassium 4.8    4.9    Chloride 107    104    Calcium 9.5    9.1    Total Protein 6.5    6.5    Albumin 4.60    4.40    Total Bilirubin 0.6    0.4    Alkaline Phosphatase 98    109    AST (SGOT) 14    21    ALT (SGPT) 16    17    Total Cholesterol  123    126   Triglycerides  49    74   HDL Cholesterol  44    42   LDL Cholesterol   69    69   Hemoglobin A1C   7.20 (A) 7.70 (A)     (A) Abnormal value       Comments are available for some flowsheets but are not being displayed.           Data reviewed: Cardiology studies Echocardiogram and nuclear stress test 4/19/2021          Assessment and Plan    1. Coronary artery disease involving native coronary artery of native heart without angina pectoris  Stable free of angina    2. S/P CABG (coronary artery bypass graft)  Stable    3. H/O heart artery stent  Stable    4. Essential hypertension  Controlled    5. Hyperlipidemia LDL goal <70  Controlled    6. Essential hypertriglyceridemia  Controlled    7. Type 2 diabetes mellitus without complication, without long-term current use of insulin (CMS/[facility])  Controlled    8.Dizziness  Resolved    Mr. Martinez is free of angina and remains euvolemic.  He does not report any significant respiratory  insufficiency or dizziness.  He will continue his current medical regimen.  I will see him in follow-up in 6 months sooner if needed.  He will remain on long-term anticoagulation as well as aspirin.        Follow Up   No follow-ups on file.  Patient was given instructions and counseling regarding his condition or for health maintenance advice. Please see specific information pulled into the AVS if appropriate.

## 2021-06-07 ENCOUNTER — OUTSIDE FACILITY SERVICE (OUTPATIENT)
Dept: GASTROENTEROLOGY | Facility: CLINIC | Age: 81
End: 2021-06-07

## 2021-06-07 PROCEDURE — 45380 COLONOSCOPY AND BIOPSY: CPT | Performed by: INTERNAL MEDICINE

## 2021-06-24 ENCOUNTER — TELEPHONE (OUTPATIENT)
Dept: GASTROENTEROLOGY | Facility: CLINIC | Age: 81
End: 2021-06-24

## 2021-06-24 NOTE — TELEPHONE ENCOUNTER
----- Message from Rob POSADA MD sent at 6/20/2021  6:35 PM EDT -----  Regarding: Biopsy results  Okay to call results, would recommend patient follow-up in 3 years to discuss options for further evaluation if he wishes to do so. Otherwise he can call for follow-up as needed.  ----- Message -----  From: Interface, Scans Incoming  Sent: 6/14/2021   9:30 AM EDT  To: Rob POSADA MD

## 2021-06-25 NOTE — TELEPHONE ENCOUNTER
Returned call to pt and advised per path that his colon polyps were not cancerous but were precancerous polyps. Advised of Dr Call's recommendations. Pt verb understanding.

## 2021-07-01 ENCOUNTER — PATIENT MESSAGE (OUTPATIENT)
Dept: FAMILY MEDICINE CLINIC | Facility: CLINIC | Age: 81
End: 2021-07-01

## 2021-07-01 DIAGNOSIS — E11.9 TYPE 2 DIABETES MELLITUS WITHOUT COMPLICATION, WITHOUT LONG-TERM CURRENT USE OF INSULIN (HCC): ICD-10-CM

## 2021-07-01 RX ORDER — METFORMIN HYDROCHLORIDE 500 MG/1
TABLET, EXTENDED RELEASE ORAL
Qty: 180 TABLET | Refills: 3 | Status: SHIPPED | OUTPATIENT
Start: 2021-07-01 | End: 2021-07-02 | Stop reason: SDUPTHER

## 2021-07-01 NOTE — TELEPHONE ENCOUNTER
From: Izabela Martinez  To: Ze Yost MD  Sent: 2021 10:14 AM EDT  Subject: Prescription Question    Dr. Yost, you prescribed Metformin  mg take two at dinner, for [place] for 30 day supply. Could you send a request for 90 supply with refills to his [facility] mail order?   Patricia Martinez for Izabela Martinez III  [birth year]    [employer] RX drug plan  Izabela Martinez III  ID [id number]  Plan [id number]  RXBin: [id number]  RxPCN: [id number]  RxGRP: [id number]

## 2021-07-02 DIAGNOSIS — E11.9 TYPE 2 DIABETES MELLITUS WITHOUT COMPLICATION, WITHOUT LONG-TERM CURRENT USE OF INSULIN (HCC): ICD-10-CM

## 2021-07-02 RX ORDER — METFORMIN HYDROCHLORIDE 500 MG/1
TABLET, EXTENDED RELEASE ORAL
Qty: 60 TABLET | Refills: 0 | Status: SHIPPED | OUTPATIENT
Start: 2021-07-02 | End: 2022-01-31 | Stop reason: SDUPTHER

## 2021-07-02 NOTE — TELEPHONE ENCOUNTER
Dr. Yost, you prescribed Metformin  mg take two at dinner, for [place] for 30 day supply.  Could you send a request for 90 supply with refills to his [facility] mail order?        Patient requesting 30 day sent to local     Sent to mail order yesterday

## 2021-07-06 RX ORDER — METFORMIN HYDROCHLORIDE 500 MG/1
TABLET, EXTENDED RELEASE ORAL
Qty: 60 TABLET | Refills: 3 | OUTPATIENT
Start: 2021-07-06

## 2021-07-28 ENCOUNTER — OFFICE VISIT (OUTPATIENT)
Dept: FAMILY MEDICINE CLINIC | Facility: CLINIC | Age: 81
End: 2021-07-28

## 2021-07-28 VITALS
WEIGHT: 210 LBS | TEMPERATURE: 98.1 F | HEART RATE: 77 BPM | DIASTOLIC BLOOD PRESSURE: 84 MMHG | BODY MASS INDEX: 28.44 KG/M2 | HEIGHT: 72 IN | OXYGEN SATURATION: 98 % | SYSTOLIC BLOOD PRESSURE: 122 MMHG

## 2021-07-28 DIAGNOSIS — Z00.00 MEDICARE ANNUAL WELLNESS VISIT, SUBSEQUENT: Primary | ICD-10-CM

## 2021-07-28 DIAGNOSIS — E78.1 ESSENTIAL HYPERTRIGLYCERIDEMIA: ICD-10-CM

## 2021-07-28 DIAGNOSIS — I10 ESSENTIAL HYPERTENSION: ICD-10-CM

## 2021-07-28 DIAGNOSIS — E11.9 TYPE 2 DIABETES MELLITUS WITHOUT COMPLICATION, WITHOUT LONG-TERM CURRENT USE OF INSULIN (HCC): ICD-10-CM

## 2021-07-28 DIAGNOSIS — E78.5 HYPERLIPIDEMIA LDL GOAL <70: ICD-10-CM

## 2021-07-28 PROCEDURE — G0439 PPPS, SUBSEQ VISIT: HCPCS | Performed by: INTERNAL MEDICINE

## 2021-07-28 NOTE — PROGRESS NOTES
Subsequent Medicare Wellness Visit   The ABC's of the Annual Wellness Visit    Chief Complaint   Patient presents with   • Annual Exam       HPI:  Izabela Martinez, SILVESTRE-1940, is a 80 y.o. male who presents for a Subsequent Medicare Wellness Visit.    History of diabetes.  Patient states to have been compliant with medications.  Blood sugar monitoring - patient states has not been checked.  No episodes of hypoglycemia, nausea, vomiting, new rashes, syncope or other issues.  Denies any difficulties and taking metformin  mg 2 tabs daily.  Last A1c on 21 of 7.7%.     History of high cholesterol.  Currently, has been feeling well without any myalgias, muscle aches, weakness, numbness, chest pain, short of breath or other issues.  Currently, is adherent with medication regimen of atorvastatin 20 mg and gemfibrozil 600 mg BID and denies medication side effects. Is due for lab follow-up.     Follow-up for history of hypertension.  Currently, has been feeling well and asymptomatic without any headaches, vision changes, cough, chest pain, shortness of breath, swelling, focal neurologic deficit, memory loss or syncope.  Has not been taking any medications.  Dr Rose discontinued the ramipril secondary to hypotension and orthostasis that has now resolved.   Home blood pressure has been running good.      Recent Hospitalizations:  No hospitalization(s) within the last year..    Current Medical Providers:  Patient Care Team:  Ze Yost MD as PCP - General (Internal Medicine)  Yosef Rose MD as Consulting Physician (Cardiology)  Elías Hensley MD as Consulting Physician (Ophthalmology)    Health Habits and Functional and Cognitive Screening and Depression Screening:  Functional & Cognitive Status 2021   Do you have difficulty preparing food and eating? No   Do you have difficulty bathing yourself, getting dressed or grooming yourself? No   Do you have difficulty using the toilet? No   Do you  have difficulty moving around from place to place? No   Do you have trouble with steps or getting out of a bed or a chair? No   Current Diet Well Balanced Diet   Dental Exam Up to date   Eye Exam Up to date   Exercise (times per week) 4 times per week   Current Exercises Include Yard Work;Other   Current Exercise Activities Include -   Do you need help using the phone?  No   Are you deaf or do you have serious difficulty hearing?  No   Do you need help with transportation? No   Do you need help shopping? No   Do you need help preparing meals?  No   Do you need help with housework?  No   Do you need help with laundry? No   Do you need help taking your medications? No   Do you need help managing money? No   Do you ever drive or ride in a car without wearing a seat belt? No   Have you felt unusual stress, anger or loneliness in the last month? No   Who do you live with? Spouse   If you need help, do you have trouble finding someone available to you? No   Have you been bothered in the last four weeks by sexual problems? No   Do you have difficulty concentrating, remembering or making decisions? No       Compared to one year ago, the patient feels his physical health is the same and his mental health is the same.    Depression Screen:  PHQ-2/PHQ-9 Depression Screening 7/28/2021   Little interest or pleasure in doing things 0   Feeling down, depressed, or hopeless 0   Trouble falling or staying asleep, or sleeping too much 0   Feeling tired or having little energy 0   Poor appetite or overeating 0   Feeling bad about yourself - or that you are a failure or have let yourself or your family down 0   Trouble concentrating on things, such as reading the newspaper or watching television 0   Moving or speaking so slowly that other people could have noticed. Or the opposite - being so fidgety or restless that you have been moving around a lot more than usual 0   Thoughts that you would be better off dead, or of hurting yourself  in some way 0   Total Score 0       Falls Risk Assessment:  CARLOS Fall Risk Clinician Key Questions   Have you fallen in the past year?: No  Do you feel unsteady with walking?: No  Are you worried about falling?: No      Past Medical/Family/Social History:  The following portions of the patient's history were reviewed and updated as appropriate: allergies, current medications, past family history, past medical history, past social history, past surgical history and problem list.    Allergies   Allergen Reactions   • Demerol [Meperidine] Dizziness         Current Outpatient Medications:   •  aspirin 81 MG EC tablet, Take 81 mg by mouth Daily., Disp: , Rfl:   •  atorvastatin (LIPITOR) 20 MG tablet, Take 1 tablet by mouth Daily., Disp: 90 tablet, Rfl: 3  •  gemfibrozil (LOPID) 600 MG tablet, Take 1 tablet by mouth 2 (Two) Times a Day Before Meals., Disp: 180 tablet, Rfl: 3  •  metFORMIN ER (GLUCOPHAGE-XR) 500 MG 24 hr tablet, Take 2 tablets daily with evening meal., Disp: 60 tablet, Rfl: 0  •  Multiple Vitamin (MULTI-VITAMIN DAILY PO), Take  by mouth., Disp: , Rfl:   •  nitroglycerin (NITROSTAT) 0.4 MG SL tablet, Place 1 tablet under the tongue Every 5 (Five) Minutes As Needed for Chest Pain. Take no more than 3 doses in 15 minutes., Disp: 25 tablet, Rfl: 3  •  rivaroxaban (XARELTO) 20 MG tablet, Take 1 tablet by mouth Daily., Disp: 90 tablet, Rfl: 3  •  vitamin C (ASCORBIC ACID) 500 MG tablet, Take 500 mg by mouth Daily., Disp: , Rfl:   •  vitamin E 400 UNIT capsule, Take 400 Units by mouth Daily., Disp: , Rfl:     Aspirin use counseling: Taking ASA appropriately as indicated    Current medication list contains no high risk medications.  No harmful drug interactions have been identified.     Family History   Problem Relation Age of Onset   • Cancer Mother    • Pancreatic cancer Mother    • Alcohol abuse Mother    • Diabetes Brother    • Heart disease Brother    • Alcohol abuse Brother    • Heart disease Maternal  Grandmother    • Heart disease Paternal Grandfather    • Alcohol abuse Father        Social History     Tobacco Use   • Smoking status: Never Smoker   • Smokeless tobacco: Never Used   Substance Use Topics   • Alcohol use: No       Past Surgical History:   Procedure Laterality Date   • APPENDECTOMY     • CATARACT EXTRACTION  2012    2011 and 2012   • COLONOSCOPY      polyps- recheck 2014. polyps repeat every 3 years   • CORONARY ARTERY BYPASS GRAFT      4 vessel 4/21/17. 4 vessel- Left internal mammary to LAD, saphenous vein graft to diagonal branch, marginal brance and the PDA branch.    • CORONARY STENT PLACEMENT     • FOOT SURGERY      right heel   • OTHER SURGICAL HISTORY      Cath stent placement    • RETINAL DETACHMENT REPAIR         Patient Active Problem List   Diagnosis   • Hx of adenomatous colonic polyps   • Diabetes mellitus (CMS/HCC)   • Hyperlipidemia LDL goal <70   • Essential hypertension   • CAD (coronary artery disease)   • S/P CABG (coronary artery bypass graft)   • H/O heart artery stent   • History of cardiac cath   • H/O echocardiogram   • History of diagnostic ultrasound   • Family history of early CAD   • Erectile dysfunction   • Anemia   • Thrombocytopenia (CMS/HCC)   • Essential hypertriglyceridemia   • Tremor   • Hearing decreased, bilateral   • Orthostatic dizziness   • Medicare annual wellness visit, subsequent   • Vestibular dizziness       Review of Systems   Constitutional: Negative for activity change, appetite change, fatigue, fever, unexpected weight gain and unexpected weight loss.   HENT: Negative for nosebleeds, rhinorrhea, trouble swallowing and voice change.    Eyes: Negative for visual disturbance.   Respiratory: Negative for cough, chest tightness, shortness of breath and wheezing.    Cardiovascular: Negative for chest pain, palpitations and leg swelling.   Gastrointestinal: Negative for abdominal pain, blood in stool, constipation, diarrhea, nausea, vomiting, GERD and  "indigestion.   Genitourinary: Negative for dysuria, frequency and hematuria.   Musculoskeletal: Negative for arthralgias, back pain and myalgias.   Skin: Negative for rash and bruise.   Neurological: Negative for dizziness, tremors, weakness, light-headedness, numbness, headache and memory problem.   Hematological: Negative for adenopathy. Does not bruise/bleed easily.   Psychiatric/Behavioral: Negative for sleep disturbance and depressed mood. The patient is not nervous/anxious.        Objective     Vitals:    07/28/21 0959   BP: 122/84   BP Location: Right arm   Patient Position: Sitting   Cuff Size: Adult   Pulse: 77   Temp: 98.1 °F (36.7 °C)   TempSrc: Temporal   SpO2: 98%   Weight: 95.3 kg (210 lb)   Height: 182.9 cm (72.01\")       Patient's Body mass index is 28.47 kg/m². indicating that he is overweight (BMI 25-29.9). Obesity-related health conditions include the following: hypertension, coronary heart disease and diabetes mellitus. Obesity is unchanged. BMI is is above average; BMI management plan is completed. We discussed portion control and increasing exercise..      No exam data present    The patient has no evidence of cognitve impairment.     Physical Exam  Vitals and nursing note reviewed.   Constitutional:       General: He is not in acute distress.     Appearance: He is well-developed. He is not diaphoretic.   HENT:      Head: Normocephalic and atraumatic.      Right Ear: External ear normal.      Left Ear: External ear normal.      Nose: Nose normal.   Eyes:      Conjunctiva/sclera: Conjunctivae normal.      Pupils: Pupils are equal, round, and reactive to light.   Neck:      Thyroid: No thyromegaly.      Trachea: No tracheal deviation.   Cardiovascular:      Rate and Rhythm: Normal rate and regular rhythm.      Heart sounds: Normal heart sounds. No murmur heard.   No friction rub. No gallop.    Pulmonary:      Effort: Pulmonary effort is normal. No respiratory distress.      Breath sounds: Normal " breath sounds.   Abdominal:      General: Bowel sounds are normal.      Palpations: Abdomen is soft. There is no mass.      Tenderness: There is no abdominal tenderness. There is no guarding.   Musculoskeletal:         General: Normal range of motion.      Cervical back: Normal range of motion and neck supple.   Lymphadenopathy:      Cervical: No cervical adenopathy.   Skin:     General: Skin is warm and dry.      Capillary Refill: Capillary refill takes less than 2 seconds.      Findings: No rash.   Neurological:      Mental Status: He is alert and oriented to person, place, and time.      Motor: No abnormal muscle tone.      Deep Tendon Reflexes: Reflexes normal.   Psychiatric:         Behavior: Behavior normal.         Thought Content: Thought content normal.         Judgment: Judgment normal.         Recent Lab Results:  Lab Results   Component Value Date     (H) 01/20/2021     Lab Results   Component Value Date    TRIG 74 01/20/2021    HDL 42 01/20/2021    VLDL 15 01/20/2021       Assessment/Plan   Age-appropriate Screening Schedule:  Refer to the list below for future screening recommendations based on patient's age, sex and/or medical conditions.      Health Maintenance   Topic Date Due   • URINE MICROALBUMIN  11/05/2020   • HEMOGLOBIN A1C  07/20/2021   • INFLUENZA VACCINE  10/01/2021   • LIPID PANEL  01/20/2022   • DIABETIC EYE EXAM  01/26/2022   • TDAP/TD VACCINES (2 - Tdap) 02/24/2025   • ZOSTER VACCINE  Discontinued       Medicare Risks and Personalized Health Plan:  Advance Directive Discussion  Fall Risk  Glaucoma Risk  Immunizations Discussed/Encouraged (specific immunizations; Shingrix )  Obesity/Overweight       CMS-Preventive Services Quick Reference  Medicare Preventive Services Addressed:  Annual Wellness Visit (AWV)  Glaucoma screening (for individuals with diabetes mellitus, family history of glaucoma, -Americans (> or =) age 50, -Americans (> or =) age 65)    Advance Care  Planning:  ACP discussion was held with the patient during this visit. Patient has an advance directive (not in EMR), copy requested.    Diagnoses and all orders for this visit:    1. Medicare annual wellness visit, subsequent (Primary)    2. Essential hypertension  -     Comprehensive Metabolic Panel  -     Lipid Panel    3. Essential hypertriglyceridemia  -     Comprehensive Metabolic Panel  -     Lipid Panel    4. Hyperlipidemia LDL goal <70  -     Comprehensive Metabolic Panel  -     Lipid Panel    5. Type 2 diabetes mellitus without complication, without long-term current use of insulin (CMS/[facility])  -     Hemoglobin A1c  -     Comprehensive Metabolic Panel  -     Lipid Panel  -     Microalbumin / Creatinine Urine Ratio - Urine, Clean Catch      Annual wellness visit reviewed with patient.  All past history, medications, social history, and problem list were reviewed.  Discussed advanced directives and living will.  Patient has living will: Living will: yes and patient will bring copy to office.  Discussed fall risk and precautions encourage removing throw rugs and using grab bars within the home and bathroom.  Will check the labs as ordered above to evaluate the blood sugars/A1c, kidney, liver, cholesterol for screening.  Discussed flu shot recommended to get the high-dose influenza vaccine annually in the fall.  The patient has a COVID HM Topic on their chart, and they are fully vaccinated.  Shingrix, Prevnar-13 and pneumovax-23 up to date and appropriate.  Encourage follow-up with the eye doctor on annual basis for glaucoma evaluation.  Discussed weight and encouraged exercise as tolerated while following a healthy diet.  Follow up with current specialists as needed.    An After Visit Summary and PPPS with all of these plans were given to the patient.      Follow Up:  Return in about 6 months (around 1/28/2022) for Next scheduled follow up.           · COVID-19 Precautions - Patient was compliant in wearing a  mask. When I saw the patient, I used appropriate personal protective equipment (PPE) including mask and eye shield (standard procedure).  Additionally, I used gown and gloves if indicated.  Hand hygiene was completed before and after seeing the patient.  · Dictated utilizing Dragon Dictation

## 2021-07-28 NOTE — PATIENT INSTRUCTIONS
Medicare Wellness  Personal Prevention Plan of Service     Date of Office Visit:  2021  Encounter Provider:  Ze Yost MD  Place of Service:  [facility]  Patient Name: Izabela Martinez  :  1940    As part of the Medicare Wellness portion of your visit today, we are providing you with this personalized preventive plan of services (PPPS). This plan is based upon recommendations of the [place] Preventive Services Task Force (USPSTF) and the Advisory Committee on Immunization Practices (ACIP).    This lists the preventive care services that should be considered, and provides dates of when you are due. Items listed as completed are up-to-date and do not require any further intervention.    Health Maintenance   Topic Date Due   • URINE MICROALBUMIN  2020   • HEMOGLOBIN A1C  2021   • ANNUAL WELLNESS VISIT  2021   • INFLUENZA VACCINE  10/01/2021   • LIPID PANEL  2022   • DIABETIC EYE EXAM  2022   • COLORECTAL CANCER SCREENING  2024   • TDAP/TD VACCINES (2 - Tdap) 2025   • COVID-19 Vaccine  Completed   • Pneumococcal Vaccine 65+  Completed   • ZOSTER VACCINE  Discontinued       Orders Placed This Encounter   Procedures   • Hemoglobin A1c     Order Specific Question:   Release to patient     Answer:   Immediate   • Comprehensive Metabolic Panel     Order Specific Question:   Release to patient     Answer:   Immediate   • Lipid Panel   • Microalbumin / Creatinine Urine Ratio - Urine, Clean Catch     Order Specific Question:   Release to patient     Answer:   Immediate       Return in about 6 months (around 2022) for Next scheduled follow up.

## 2021-07-29 LAB
ALBUMIN SERPL-MCNC: 4.6 G/DL (ref 3.5–5.2)
ALBUMIN/CREAT UR: 13 MG/G CREAT (ref 0–29)
ALBUMIN/GLOB SERPL: 2 G/DL
ALP SERPL-CCNC: 112 U/L (ref 39–117)
ALT SERPL-CCNC: 19 U/L (ref 1–41)
AST SERPL-CCNC: 22 U/L (ref 1–40)
BILIRUB SERPL-MCNC: 0.6 MG/DL (ref 0–1.2)
BUN SERPL-MCNC: 20 MG/DL (ref 8–23)
BUN/CREAT SERPL: 21.5 (ref 7–25)
CALCIUM SERPL-MCNC: 9.7 MG/DL (ref 8.6–10.5)
CHLORIDE SERPL-SCNC: 105 MMOL/L (ref 98–107)
CHOLEST SERPL-MCNC: 131 MG/DL (ref 0–200)
CO2 SERPL-SCNC: 26.1 MMOL/L (ref 22–29)
CREAT SERPL-MCNC: 0.93 MG/DL (ref 0.76–1.27)
CREAT UR-MCNC: 62.9 MG/DL
GLOBULIN SER CALC-MCNC: 2.3 GM/DL
GLUCOSE SERPL-MCNC: 112 MG/DL (ref 65–99)
HBA1C MFR BLD: 7.4 % (ref 4.8–5.6)
HDLC SERPL-MCNC: 40 MG/DL (ref 40–60)
LDLC SERPL CALC-MCNC: 73 MG/DL (ref 0–100)
MICROALBUMIN UR-MCNC: 7.9 UG/ML
POTASSIUM SERPL-SCNC: 4.9 MMOL/L (ref 3.5–5.2)
PROT SERPL-MCNC: 6.9 G/DL (ref 6–8.5)
SODIUM SERPL-SCNC: 142 MMOL/L (ref 136–145)
TRIGL SERPL-MCNC: 94 MG/DL (ref 0–150)
VLDLC SERPL CALC-MCNC: 18 MG/DL (ref 5–40)

## 2021-09-25 DIAGNOSIS — E78.5 HYPERLIPIDEMIA LDL GOAL <100: ICD-10-CM

## 2021-09-27 RX ORDER — ATORVASTATIN CALCIUM 20 MG/1
TABLET, FILM COATED ORAL
Qty: 90 TABLET | Refills: 3 | Status: SHIPPED | OUTPATIENT
Start: 2021-09-27 | End: 2022-06-15 | Stop reason: SDUPTHER

## 2021-10-14 NOTE — PROGRESS NOTES
"Chief Complaint  Coronary Artery Disease    Subjective    History of Present Illness      I saw Izabela Martinez today for continued cardiovascular care.  He is a very pleasant 81-year-old male with a long history known coronary heart disease.  He has undergone redo coronary artery bypass surgery April 2017.  At that time he had a placement of a saphenous vein graft to the LAD, right coronary artery and sequential saphenous vein graft to the marginal branches of the circumflex. Remains free of chest pain pressure tightness.  He does not report any significant or limiting dyspnea on exertion.  He is free of orthopnea, PND or lower extremity edema.  He does report some generalized increased fatigue.  He remains free of syncope near syncope or palpitations.    Echocardiogram obtained 4/19/2021 reveals preserved left ventricular function, right ventricular systolic pressure 48 mmHg with mild to moderate tricuspid regurgitation, no other significant valvular abnormality.  Nuclear stress test obtained on the same day revealed left ventricular ejection fraction 46% small size infarct in the apex no other ischemia noted.    Objective   Vital Signs:   /68   Pulse 74   Ht 182.9 cm (72\")   Wt 93 kg (205 lb)   BMI 27.80 kg/m²     Constitutional:       Appearance: Well-developed.   Eyes:      Conjunctiva/sclera: Conjunctivae normal.      Pupils: Pupils are equal, round, and reactive to light.   HENT:      Head: Normocephalic and atraumatic.   Neck:      Thyroid: No thyromegaly.   Pulmonary:      Effort: Pulmonary effort is normal. No respiratory distress.      Breath sounds: Normal breath sounds. No wheezing. No rales.   Cardiovascular:      Normal rate. Regular rhythm.      S4 Gallop. No S3 gallop. No rub.   Edema:     Peripheral edema absent.   Abdominal:      General: Bowel sounds are normal. There is no distension.      Palpations: Abdomen is soft. There is no abdominal mass.      Tenderness: There is no abdominal " tenderness.   Musculoskeletal: Normal range of motion.      Cervical back: Neck supple. Skin:     General: Skin is warm and dry.      Findings: No erythema.   Neurological:      Mental Status: Alert and oriented to person, place, and time.         Result Review :     Common labs    Common Labsle 1/20/21 1/20/21 1/20/21 7/28/21 7/28/21 7/28/21 7/28/21    0911 0911 0911 1055 1055 1055 1055   Glucose  135 (A)   112 (A)     BUN  25 (A)   20     Creatinine  1.06   0.93     eGFR Non  Am  67   78     eGFR African Am  81   95     Sodium  140   142     Potassium  4.9   4.9     Chloride  104   105     Calcium  9.1   9.7     Total Protein  6.5   6.9     Albumin  4.40   4.60     Total Bilirubin  0.4   0.6     Alkaline Phosphatase  109   112     AST (SGOT)  21   22     ALT (SGPT)  17   19     Total Cholesterol   126   131    Triglycerides   74   94    HDL Cholesterol   42   40    LDL Cholesterol    69   73    Hemoglobin A1C 7.70 (A)   7.40 (A)      Microalbumin, Urine       7.9   (A) Abnormal value       Comments are available for some flowsheets but are not being displayed.                     Assessment and Plan    1. Coronary artery disease involving native coronary artery of native heart without angina pectoris  Stable    2. S/P CABG (coronary artery bypass graft)  Stable    3. H/O heart artery stent  Stable    4. Essential hypertension  Controlled    5. Hyperlipidemia LDL goal <70  Controlled    6. Essential hypertriglyceridemia  Controlled    7. Family history of early CAD    Izabela remains active free of angina.  He is euvolemic on physical examination.  He tolerates his medications well and does not report any significant side effects.  He is aware of the importance of continued risk modification by observing low-cholesterol low saturated fat diet and following his medical regimen.  I will see him in follow-up in 6 months sooner if needed.        Follow Up   No follow-ups on file.  Patient was given instructions  and counseling regarding his condition or for health maintenance advice. Please see specific information pulled into the AVS if appropriate.

## 2021-10-15 ENCOUNTER — OFFICE VISIT (OUTPATIENT)
Dept: CARDIOLOGY | Facility: CLINIC | Age: 81
End: 2021-10-15

## 2021-10-15 VITALS
HEIGHT: 72 IN | HEART RATE: 74 BPM | SYSTOLIC BLOOD PRESSURE: 108 MMHG | DIASTOLIC BLOOD PRESSURE: 68 MMHG | BODY MASS INDEX: 27.77 KG/M2 | WEIGHT: 205 LBS

## 2021-10-15 DIAGNOSIS — E78.1 ESSENTIAL HYPERTRIGLYCERIDEMIA: ICD-10-CM

## 2021-10-15 DIAGNOSIS — Z95.1 S/P CABG (CORONARY ARTERY BYPASS GRAFT): ICD-10-CM

## 2021-10-15 DIAGNOSIS — I10 ESSENTIAL HYPERTENSION: ICD-10-CM

## 2021-10-15 DIAGNOSIS — E78.5 HYPERLIPIDEMIA LDL GOAL <70: ICD-10-CM

## 2021-10-15 DIAGNOSIS — Z95.5 H/O HEART ARTERY STENT: ICD-10-CM

## 2021-10-15 DIAGNOSIS — I25.10 CORONARY ARTERY DISEASE INVOLVING NATIVE CORONARY ARTERY OF NATIVE HEART WITHOUT ANGINA PECTORIS: Primary | ICD-10-CM

## 2021-10-15 DIAGNOSIS — Z82.49 FAMILY HISTORY OF EARLY CAD: ICD-10-CM

## 2021-10-15 PROCEDURE — 99214 OFFICE O/P EST MOD 30 MIN: CPT | Performed by: INTERNAL MEDICINE

## 2022-01-26 ENCOUNTER — OFFICE VISIT (OUTPATIENT)
Dept: FAMILY MEDICINE CLINIC | Facility: CLINIC | Age: 82
End: 2022-01-26

## 2022-01-26 VITALS
BODY MASS INDEX: 28.71 KG/M2 | DIASTOLIC BLOOD PRESSURE: 80 MMHG | TEMPERATURE: 98.1 F | WEIGHT: 212 LBS | HEIGHT: 72 IN | SYSTOLIC BLOOD PRESSURE: 132 MMHG

## 2022-01-26 DIAGNOSIS — I10 ESSENTIAL HYPERTENSION: Chronic | ICD-10-CM

## 2022-01-26 DIAGNOSIS — E11.9 TYPE 2 DIABETES MELLITUS WITHOUT COMPLICATION, WITHOUT LONG-TERM CURRENT USE OF INSULIN: Primary | Chronic | ICD-10-CM

## 2022-01-26 DIAGNOSIS — E78.1 ESSENTIAL HYPERTRIGLYCERIDEMIA: Chronic | ICD-10-CM

## 2022-01-26 PROBLEM — H81.90 VESTIBULAR DIZZINESS: Status: RESOLVED | Noted: 2021-04-27 | Resolved: 2022-01-26

## 2022-01-26 PROBLEM — R42 ORTHOSTATIC DIZZINESS: Status: RESOLVED | Noted: 2019-11-20 | Resolved: 2022-01-26

## 2022-01-26 PROCEDURE — 99214 OFFICE O/P EST MOD 30 MIN: CPT | Performed by: INTERNAL MEDICINE

## 2022-01-26 NOTE — PROGRESS NOTES
Concha Martinez is a 81 y.o. male.     Chief Complaint   Patient presents with   • Diabetes       Diabetes  Pertinent negatives for hypoglycemia include no dizziness, nervousness/anxiousness or tremors. Pertinent negatives for diabetes include no chest pain, no fatigue, no weakness and no weight loss.      Answers for HPI/ROS submitted by the patient on 1/23/2022  What is the primary reason for your visit?: Other  Please describe your symptoms.: 6 month check up to monitor meds  Have you had these symptoms before?: No  How long have you been having these symptoms?: Greater than 2 weeks  Please list any medications you are currently taking for this condition.: none  Please describe any probable cause for these symptoms. : N/A    History of diabetes.  Patient states to have been compliant with medications.  Blood sugar monitoring - patient states has not been checked.  No episodes of hypoglycemia, nausea, vomiting, new rashes, syncope or other issues.  Denies any difficulties and taking metformin  mg 2 tabs daily.  Last A1c on  7/28/2021 of 7.4%.     History of high cholesterol.  Currently, has been feeling well without any myalgias, muscle aches, weakness, numbness, chest pain, short of breath or other issues.  Currently, is adherent with medication regimen of atorvastatin 20 mg and gemfibrozil 600 mg BID and denies medication side effects. Is due for lab follow-up.     Follow-up for history of hypertension.  Currently, has been feeling well and asymptomatic without any headaches, vision changes, cough, chest pain, shortness of breath, swelling, focal neurologic deficit, memory loss or syncope.  Has not been taking any medications.  Dr Rose discontinued the ramipril in the past secondary to hypotension and orthostasis that has now resolved.   Home blood pressure has been running good.      The following portions of the patient's history were reviewed and updated as appropriate: allergies,  current medications, past family history, past medical history, past social history, past surgical history and problem list.    Depression Screen:  PHQ-2/PHQ-9 Depression Screening 7/28/2021   Little interest or pleasure in doing things 0   Feeling down, depressed, or hopeless 0   Trouble falling or staying asleep, or sleeping too much 0   Feeling tired or having little energy 0   Poor appetite or overeating 0   Feeling bad about yourself - or that you are a failure or have let yourself or your family down 0   Trouble concentrating on things, such as reading the newspaper or watching television 0   Moving or speaking so slowly that other people could have noticed. Or the opposite - being so fidgety or restless that you have been moving around a lot more than usual 0   Thoughts that you would be better off dead, or of hurting yourself in some way 0   Total Score 0       Past Medical History:   Diagnosis Date   • Allergic cough    • Anemia    • Arteriosclerosis of coronary artery    • Benign enlargement of prostate    • Benign essential hypertension    • Blister of foot, left    • BMI 30.0-30.9,adult    • CAD (coronary artery disease) 6/11/2019   • Colon polyp 6/21   • Cough    • Diabetes mellitus (HCC) 6/11/2019   • Disorder of skin    • Essential hypertension 6/11/2019    Benign   • Essential hypertriglyceridemia    • H/O echocardiogram 6/11/2019    3/21/14 - Mild concentric LVG.  EF 55-60%.  Mild MR.  Moderate MN.     • H/O heart artery stent 6/11/2019    3/20/14 - PCI to the SVG of the RCA with a RESOLUTE drug eluting stent   • Hammer toe     right second-fourth toes    • History of cancer    • History of cardiac cath 6/11/2019 4/17/17 - Severe three vessel obstructive CAD.  Severely diseased vein grafts (SVG to ?OM, and SVG to RCA) that are the source of the patient's unstable angina..  EF 65%.   • History of diagnostic ultrasound 6/11/2019 4/18/17 - CAROTID ULTRASOUND - No evidence of significant carotid  stenosis.  NASCET criteria was utilized.   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 polyps repeat every 3 years   • CORONARY ARTERY BYPASS GRAFT      4 vessel 4/21/17. 4 vessel- Left internal mammary to LAD, saphenous vein graft to diagonal branch, marginal brance and the PDA branch.    • CORONARY STENT PLACEMENT     • FOOT SURGERY      right heel   • OTHER SURGICAL HISTORY      Cath stent placement    • RETINAL DETACHMENT REPAIR         Family History   Problem Relation Age of Onset   • Cancer Mother    • Pancreatic cancer Mother    • Alcohol abuse Mother    • Diabetes Brother    • Heart disease Brother    • Alcohol abuse Brother    • Heart disease Maternal Grandmother    • Heart disease Paternal Grandfather    • Alcohol abuse Father    • Heart disease Maternal Grandfather    • Stroke Paternal Grandmother        Social History     Socioeconomic History   • Marital status:    Tobacco Use   • Smoking status: Never Smoker   • Smokeless tobacco: Never Used   Substance and Sexual Activity   • Alcohol use: Not Currently     Alcohol/week: 0.0 standard drinks   • Drug use: Never   • Sexual activity: Not Currently     Partners: Female     Birth control/protection: None       Current Outpatient Medications   Medication Sig Dispense Refill   • aspirin 81 MG EC  tablet Take 81 mg by mouth Daily.     • atorvastatin (LIPITOR) 20 MG tablet TAKE 1 TABLET DAILY 90 tablet 3   • gemfibrozil (LOPID) 600 MG tablet Take 1 tablet by mouth 2 (Two) Times a Day Before Meals. 180 tablet 3   • metFORMIN ER (GLUCOPHAGE-XR) 500 MG 24 hr tablet Take 2 tablets daily with evening meal. 60 tablet 0   • Multiple Vitamin (MULTI-VITAMIN DAILY PO) Take  by mouth.     • nitroglycerin (NITROSTAT) 0.4 MG SL tablet Place 1 tablet under the tongue Every 5 (Five) Minutes As Needed for Chest Pain. Take no more than 3 doses in 15 minutes. 25 tablet 3   • rivaroxaban (XARELTO) 20 MG tablet Take 1 tablet by mouth Daily. 90 tablet 3   • vitamin C (ASCORBIC ACID) 500 MG tablet Take 500 mg by mouth Daily.     • vitamin E 400 UNIT capsule Take 400 Units by mouth Daily.       No current facility-administered medications for this visit.       Review of Systems   Constitutional: Negative for activity change, appetite change, fatigue, fever, unexpected weight gain and unexpected weight loss.   HENT: Negative for nosebleeds, rhinorrhea, trouble swallowing and voice change.    Eyes: Negative for visual disturbance.   Respiratory: Negative for cough, chest tightness, shortness of breath and wheezing.    Cardiovascular: Negative for chest pain, palpitations and leg swelling.   Gastrointestinal: Negative for abdominal pain, blood in stool, constipation, diarrhea, nausea, vomiting, GERD and indigestion.   Genitourinary: Negative for dysuria, frequency and hematuria.   Musculoskeletal: Negative for arthralgias, back pain and myalgias.   Skin: Negative for rash and wound.   Neurological: Negative for dizziness, tremors, weakness, light-headedness, numbness, headache and memory problem.   Hematological: Negative for adenopathy. Does not bruise/bleed easily.   Psychiatric/Behavioral: Negative for sleep disturbance and depressed mood. The patient is not nervous/anxious.        Objective   /80 (BP Location: Left arm,  "Patient Position: Sitting, Cuff Size: Adult)   Temp 98.1 °F (36.7 °C) (Temporal)   Ht 182.9 cm (72.01\")   Wt 96.2 kg (212 lb)   BMI 28.75 kg/m²     Physical Exam  Vitals and nursing note reviewed.   Constitutional:       General: He is not in acute distress.     Appearance: He is well-developed. He is not diaphoretic.   HENT:      Head: Normocephalic and atraumatic.      Right Ear: External ear normal.      Left Ear: External ear normal.      Nose: Nose normal.   Eyes:      Conjunctiva/sclera: Conjunctivae normal.      Pupils: Pupils are equal, round, and reactive to light.   Neck:      Thyroid: No thyromegaly.      Trachea: No tracheal deviation.   Cardiovascular:      Rate and Rhythm: Normal rate and regular rhythm.      Heart sounds: Normal heart sounds. No murmur heard.  No friction rub. No gallop.    Pulmonary:      Effort: Pulmonary effort is normal. No respiratory distress.      Breath sounds: Normal breath sounds.   Abdominal:      General: Bowel sounds are normal.      Palpations: Abdomen is soft. There is no mass.      Tenderness: There is no abdominal tenderness. There is no guarding.   Musculoskeletal:         General: Normal range of motion.      Cervical back: Normal range of motion and neck supple.   Lymphadenopathy:      Cervical: No cervical adenopathy.   Skin:     General: Skin is warm and dry.      Capillary Refill: Capillary refill takes less than 2 seconds.      Findings: No rash.   Neurological:      Mental Status: He is alert and oriented to person, place, and time.      Motor: No abnormal muscle tone.      Deep Tendon Reflexes: Reflexes normal.   Psychiatric:         Behavior: Behavior normal.         Thought Content: Thought content normal.         Judgment: Judgment normal.         No results found for this or any previous visit (from the past 2016 hour(s)).  Assessment/Plan   Diagnoses and all orders for this visit:    1. Type 2 diabetes mellitus without complication, without long-term " current use of insulin (HCC) (Primary)  -     Hemoglobin A1c  -     Comprehensive Metabolic Panel  -     Lipid Panel    2. Essential hypertension  -     Comprehensive Metabolic Panel  -     Lipid Panel    3. Essential hypertriglyceridemia  -     Comprehensive Metabolic Panel  -     Lipid Panel    Type 2 diabetes mildly uncontrolled on last A1c.  We will check the A1c today and if he is worsened with is controlled and we will increase the Metformin.  In the future we may want to consider the use of Jardiance.  Hypertension otherwise is controlled.  Continue current medication.  We will check a liver and lipid panel secondary to the medications.           · COVID-19 Precautions - Patient was compliant in wearing a mask. When I saw the patient, I used appropriate personal protective equipment (PPE) including mask and eye shield (standard procedure).  Additionally, I used gown and gloves if indicated.  Hand hygiene was completed before and after seeing the patient.  · Dictated utilizing Dragon Dictation

## 2022-01-27 LAB
ALBUMIN SERPL-MCNC: 4.1 G/DL (ref 3.6–4.6)
ALBUMIN/GLOB SERPL: 1.6 {RATIO} (ref 1.2–2.2)
ALP SERPL-CCNC: 100 IU/L (ref 44–121)
ALT SERPL-CCNC: 20 IU/L (ref 0–44)
AST SERPL-CCNC: 22 IU/L (ref 0–40)
BILIRUB SERPL-MCNC: 0.5 MG/DL (ref 0–1.2)
BUN SERPL-MCNC: 22 MG/DL (ref 8–27)
BUN/CREAT SERPL: 22 (ref 10–24)
CALCIUM SERPL-MCNC: 9 MG/DL (ref 8.6–10.2)
CHLORIDE SERPL-SCNC: 106 MMOL/L (ref 96–106)
CHOLEST SERPL-MCNC: 128 MG/DL (ref 100–199)
CO2 SERPL-SCNC: 24 MMOL/L (ref 20–29)
CREAT SERPL-MCNC: 1.01 MG/DL (ref 0.76–1.27)
GLOBULIN SER CALC-MCNC: 2.5 G/DL (ref 1.5–4.5)
GLUCOSE SERPL-MCNC: 136 MG/DL (ref 65–99)
HBA1C MFR BLD: 7.8 % (ref 4.8–5.6)
HDLC SERPL-MCNC: 39 MG/DL
LDLC SERPL CALC-MCNC: 73 MG/DL (ref 0–99)
POTASSIUM SERPL-SCNC: 4.5 MMOL/L (ref 3.5–5.2)
PROT SERPL-MCNC: 6.6 G/DL (ref 6–8.5)
SODIUM SERPL-SCNC: 143 MMOL/L (ref 134–144)
TRIGL SERPL-MCNC: 81 MG/DL (ref 0–149)
VLDLC SERPL CALC-MCNC: 16 MG/DL (ref 5–40)

## 2022-01-31 ENCOUNTER — PATIENT MESSAGE (OUTPATIENT)
Dept: FAMILY MEDICINE CLINIC | Facility: CLINIC | Age: 82
End: 2022-01-31

## 2022-01-31 DIAGNOSIS — E11.9 TYPE 2 DIABETES MELLITUS WITHOUT COMPLICATION, WITHOUT LONG-TERM CURRENT USE OF INSULIN: ICD-10-CM

## 2022-01-31 DIAGNOSIS — E78.5 HYPERLIPIDEMIA LDL GOAL <100: ICD-10-CM

## 2022-01-31 RX ORDER — GEMFIBROZIL 600 MG/1
600 TABLET, FILM COATED ORAL
Qty: 180 TABLET | Refills: 3 | Status: SHIPPED | OUTPATIENT
Start: 2022-01-31 | End: 2022-06-15 | Stop reason: SDUPTHER

## 2022-01-31 RX ORDER — METFORMIN HYDROCHLORIDE 500 MG/1
TABLET, EXTENDED RELEASE ORAL
Qty: 360 TABLET | Refills: 2 | Status: SHIPPED | OUTPATIENT
Start: 2022-01-31 | End: 2022-06-15 | Stop reason: SDUPTHER

## 2022-01-31 NOTE — TELEPHONE ENCOUNTER
From: Izabela Martinez  To: Ze Yost MD  Sent: 1/31/2022 3:33 PM EST  Subject: Metformin    Please send an order for Metformin to my mail order pharmacy with the new instructions for taking it. I was told to take it both morning and now night. Thank you. Coleman Martinez

## 2022-01-31 NOTE — TELEPHONE ENCOUNTER
Rx Refill Note  Requested Prescriptions     Pending Prescriptions Disp Refills   • metFORMIN ER (GLUCOPHAGE-XR) 500 MG 24 hr tablet 360 tablet 2     Sig: Take 2 tablets twice daily with meal      Last office visit with prescribing clinician: 1/26/2022      Next office visit with prescribing clinician: 7/20/2022            Stacy Ervin MA  01/31/22, 15:54 EST

## 2022-04-14 NOTE — PROGRESS NOTES
"Chief Complaint  Coronary Artery Disease    Subjective    History of Present Illness      I saw Izabela Martinez today for continued cardiovascular care.  He is a very pleasant 81-year-old male with a history of coronary heart disease.  He is undergone redo coronary artery bypass surgery April 2017.  He remains free of angina.  He does not report any significant or limiting dyspnea on exertion.  He is free of orthopnea or PND and no significant lower extremity edema.  He denies syncope near syncope or palpitations.  He remains very active without any significant limitation or reduction in his activity level.  He is known to have a history of obstructive sleep apnea but does not use a device.  His last echocardiogram from 4/19/2021 revealed preserved left ventricular function but right ventricular systolic pressure was 48 mmHg with mild to moderate tricuspid regurgitation.    Objective   Vital Signs:   /62   Pulse 75   Ht 182.9 cm (72\")   Wt 91.6 kg (202 lb)   BMI 27.40 kg/m²     Constitutional:       Appearance: Well-developed.   Eyes:      Conjunctiva/sclera: Conjunctivae normal.      Pupils: Pupils are equal, round, and reactive to light.   HENT:      Head: Normocephalic and atraumatic.   Neck:      Thyroid: No thyromegaly.   Pulmonary:      Effort: Pulmonary effort is normal. No respiratory distress.      Breath sounds: Normal breath sounds. No wheezing. No rales.   Cardiovascular:      Normal rate. Regular rhythm.      S4 Gallop. No S3 gallop. No rub.   Edema:     Peripheral edema absent.   Abdominal:      General: Bowel sounds are normal. There is no distension.      Palpations: Abdomen is soft. There is no abdominal mass.      Tenderness: There is no abdominal tenderness.   Musculoskeletal: Normal range of motion.      Cervical back: Neck supple. Skin:     General: Skin is warm and dry.      Findings: No erythema.   Neurological:      Mental Status: Alert and oriented to person, place, and time.       "   Result Review :     Common labs    Common Labsle 7/28/21 7/28/21 7/28/21 7/28/21 1/26/22 1/26/22 1/26/22    1055 1055 1055 1055 [id number]   Glucose  112 (A)    136 (A)    BUN  20    22    Creatinine  0.93    1.01    eGFR Non  Am  78    69    eGFR African Am  95    80    Sodium  142    143    Potassium  4.9    4.5    Chloride  105    106    Calcium  9.7    9.0    Total Protein  6.9    6.6    Albumin  4.60    4.1    Total Bilirubin  0.6    0.5    Alkaline Phosphatase  112    100    AST (SGOT)  22    22    ALT (SGPT)  19    20    Total Cholesterol   131    128   Triglycerides   94    81   HDL Cholesterol   40    39 (A)   LDL Cholesterol    73    73   Hemoglobin A1C 7.40 (A)    7.8 (A)     Microalbumin, Urine    7.9      (A) Abnormal value       Comments are available for some flowsheets but are not being displayed.                     Assessment and Plan {CC Problem List  Visit Diagnosis  ROS  Review (Popup)  Health Maintenance  Quality  BestPractice  Medications  SmartSets  SnapShot Encounters  Media :23}   1. Coronary artery disease involving native coronary artery of native heart without angina pectoris  Stable free of angina    2. S/P CABG (coronary artery bypass graft)  Stable free of angina    3. H/O heart artery stent  Stable    4. Essential hypertension  Controlled    5. Hyperlipidemia LDL goal <70  Controlled    6. Family history of early CAD      7. Type 2 diabetes mellitus without complication, without long-term current use of insulin (HCC)  Stable    8. Tricuspid valve insufficiency, unspecified etiology  Mild to moderate and compensated    9. Moderate pulmonary hypertension (HCC)  Reassess    Zeenat remains active free of angina and is euvolemic on examination.  I discussed reassessment of his obstructive sleep apnea for potential treatment.  He is resistant at present.  I will however obtain an echocardiogram.  I will compare his right ventricular systolic pressure to his previous  echo.  If there is progression he is agreed to be reassessed for treatment of obstructive sleep apnea.  I will otherwise see him in follow-up in 6 months certainly sooner if needed.  I have counseled him on continued risk modification by remaining aerobically active and following a low-cholesterol low saturated fat diet.        Follow Up   No follow-ups on file.  Patient was given instructions and counseling regarding his condition or for health maintenance advice. Please see specific information pulled into the AVS if appropriate.

## 2022-04-15 ENCOUNTER — OFFICE VISIT (OUTPATIENT)
Dept: CARDIOLOGY | Facility: CLINIC | Age: 82
End: 2022-04-15

## 2022-04-15 VITALS
HEIGHT: 72 IN | SYSTOLIC BLOOD PRESSURE: 102 MMHG | DIASTOLIC BLOOD PRESSURE: 62 MMHG | WEIGHT: 202 LBS | BODY MASS INDEX: 27.36 KG/M2 | HEART RATE: 75 BPM

## 2022-04-15 DIAGNOSIS — Z95.1 S/P CABG (CORONARY ARTERY BYPASS GRAFT): ICD-10-CM

## 2022-04-15 DIAGNOSIS — Z95.5 H/O HEART ARTERY STENT: ICD-10-CM

## 2022-04-15 DIAGNOSIS — I25.10 CORONARY ARTERY DISEASE INVOLVING NATIVE CORONARY ARTERY OF NATIVE HEART WITHOUT ANGINA PECTORIS: ICD-10-CM

## 2022-04-15 DIAGNOSIS — E78.5 HYPERLIPIDEMIA LDL GOAL <70: ICD-10-CM

## 2022-04-15 DIAGNOSIS — I10 ESSENTIAL HYPERTENSION: ICD-10-CM

## 2022-04-15 DIAGNOSIS — I07.1 TRICUSPID VALVE INSUFFICIENCY, UNSPECIFIED ETIOLOGY: ICD-10-CM

## 2022-04-15 DIAGNOSIS — I27.20 MODERATE PULMONARY HYPERTENSION: Primary | ICD-10-CM

## 2022-04-15 DIAGNOSIS — E11.9 TYPE 2 DIABETES MELLITUS WITHOUT COMPLICATION, WITHOUT LONG-TERM CURRENT USE OF INSULIN: ICD-10-CM

## 2022-04-15 DIAGNOSIS — Z82.49 FAMILY HISTORY OF EARLY CAD: ICD-10-CM

## 2022-04-15 PROCEDURE — 99214 OFFICE O/P EST MOD 30 MIN: CPT | Performed by: INTERNAL MEDICINE

## 2022-05-31 ENCOUNTER — HOSPITAL ENCOUNTER (OUTPATIENT)
Dept: CARDIOLOGY | Facility: HOSPITAL | Age: 82
Discharge: HOME | End: 2022-05-31
Admitting: INTERNAL MEDICINE

## 2022-05-31 VITALS — HEIGHT: 72 IN | WEIGHT: 202 LBS | BODY MASS INDEX: 27.36 KG/M2

## 2022-05-31 DIAGNOSIS — I27.20 MODERATE PULMONARY HYPERTENSION: ICD-10-CM

## 2022-05-31 DIAGNOSIS — I10 ESSENTIAL HYPERTENSION: ICD-10-CM

## 2022-05-31 PROCEDURE — 93306 TTE W/DOPPLER COMPLETE: CPT | Performed by: INTERNAL MEDICINE

## 2022-05-31 PROCEDURE — 93356 MYOCRD STRAIN IMG SPCKL TRCK: CPT

## 2022-05-31 PROCEDURE — 93356 MYOCRD STRAIN IMG SPCKL TRCK: CPT | Performed by: INTERNAL MEDICINE

## 2022-05-31 PROCEDURE — 93306 TTE W/DOPPLER COMPLETE: CPT

## 2022-06-01 LAB
BH CV ECHO LEFT VENTRICLE GLOBAL LONGITUDINAL STRAIN: -16.9 %
BH CV ECHO MEAS - AO MAX PG: 8.9 MMHG
BH CV ECHO MEAS - AO MEAN PG: 4.1 MMHG
BH CV ECHO MEAS - AO V2 MAX: 149.3 CM/SEC
BH CV ECHO MEAS - AO V2 VTI: 32.6 CM
BH CV ECHO MEAS - AVA(I,D): 2.19 CM2
BH CV ECHO MEAS - EDV(CUBED): 77.9 ML
BH CV ECHO MEAS - EDV(MOD-SP2): 100 ML
BH CV ECHO MEAS - EDV(MOD-SP4): 117 ML
BH CV ECHO MEAS - EF(MOD-BP): 54.2 %
BH CV ECHO MEAS - EF(MOD-SP2): 48 %
BH CV ECHO MEAS - EF(MOD-SP4): 56.4 %
BH CV ECHO MEAS - EF_3D-VOL: 50 %
BH CV ECHO MEAS - ESV(CUBED): 30.1 ML
BH CV ECHO MEAS - ESV(MOD-SP2): 52 ML
BH CV ECHO MEAS - ESV(MOD-SP4): 51 ML
BH CV ECHO MEAS - FS: 27.1 %
BH CV ECHO MEAS - IVS/LVPW: 1.03 CM
BH CV ECHO MEAS - IVSD: 1.19 CM
BH CV ECHO MEAS - LAT PEAK E' VEL: 11.5 CM/SEC
BH CV ECHO MEAS - LV DIASTOLIC VOL/BSA (35-75): 54.7 CM2
BH CV ECHO MEAS - LV MASS(C)D: 176.6 GRAMS
BH CV ECHO MEAS - LV MAX PG: 2.8 MMHG
BH CV ECHO MEAS - LV MEAN PG: 1.4 MMHG
BH CV ECHO MEAS - LV SYSTOLIC VOL/BSA (12-30): 23.8 CM2
BH CV ECHO MEAS - LV V1 MAX: 83.1 CM/SEC
BH CV ECHO MEAS - LV V1 VTI: 17.3 CM
BH CV ECHO MEAS - LVIDD: 4.3 CM
BH CV ECHO MEAS - LVIDS: 3.1 CM
BH CV ECHO MEAS - LVOT AREA: 4.1 CM2
BH CV ECHO MEAS - LVOT DIAM: 2.29 CM
BH CV ECHO MEAS - LVPWD: 1.15 CM
BH CV ECHO MEAS - MED PEAK E' VEL: 7.5 CM/SEC
BH CV ECHO MEAS - MR MAX PG: 63.2 MMHG
BH CV ECHO MEAS - MR MAX VEL: 397.6 CM/SEC
BH CV ECHO MEAS - MV A DUR: 0.16 SEC
BH CV ECHO MEAS - MV A MAX VEL: 53.1 CM/SEC
BH CV ECHO MEAS - MV DEC SLOPE: 133.7 CM/SEC2
BH CV ECHO MEAS - MV DEC TIME: 222 MSEC
BH CV ECHO MEAS - MV E MAX VEL: 44.1 CM/SEC
BH CV ECHO MEAS - MV E/A: 0.83
BH CV ECHO MEAS - MV MAX PG: 1.18 MMHG
BH CV ECHO MEAS - MV MEAN PG: 0.55 MMHG
BH CV ECHO MEAS - MV P1/2T: 115.3 MSEC
BH CV ECHO MEAS - MV V2 VTI: 20.9 CM
BH CV ECHO MEAS - MVA(P1/2T): 1.91 CM2
BH CV ECHO MEAS - MVA(VTI): 3.4 CM2
BH CV ECHO MEAS - PA ACC TIME: 0.13 SEC
BH CV ECHO MEAS - PA PR(ACCEL): 18.4 MMHG
BH CV ECHO MEAS - PA V2 MAX: 90.2 CM/SEC
BH CV ECHO MEAS - PI END-D VEL: 115.4 CM/SEC
BH CV ECHO MEAS - QP/QS: 1.22
BH CV ECHO MEAS - RAP SYSTOLE: 3 MMHG
BH CV ECHO MEAS - RV MAX PG: 1.09 MMHG
BH CV ECHO MEAS - RV V1 MAX: 52.3 CM/SEC
BH CV ECHO MEAS - RV V1 VTI: 12.1 CM
BH CV ECHO MEAS - RVOT DIAM: 3 CM
BH CV ECHO MEAS - RVSP: 22 MMHG
BH CV ECHO MEAS - SI(MOD-SP2): 22.4 ML/M2
BH CV ECHO MEAS - SI(MOD-SP4): 30.8 ML/M2
BH CV ECHO MEAS - SV(LVOT): 71.5 ML
BH CV ECHO MEAS - SV(MOD-SP2): 48 ML
BH CV ECHO MEAS - SV(MOD-SP4): 66 ML
BH CV ECHO MEAS - SV(RVOT): 87.4 ML
BH CV ECHO MEAS - TAPSE (>1.6): 1.5 CM
BH CV ECHO MEAS - TR MAX PG: 19 MMHG
BH CV ECHO MEAS - TR MAX VEL: 218 CM/SEC
BH CV ECHO MEASUREMENTS AVERAGE E/E' RATIO: 4.64
BH CV XLRA - RV BASE: 3.2 CM
BH CV XLRA - RV LENGTH: 7.5 CM
BH CV XLRA - RV MID: 3.1 CM
BH CV XLRA - TDI S': 7.6 CM/SEC
LEFT ATRIUM VOLUME INDEX: 35.5 ML/M2
MAXIMAL PREDICTED HEART RATE: 138 BPM
SINUS: 3.3 CM
STJ: 2.6 CM
STRESS TARGET HR: 117 BPM

## 2022-06-15 ENCOUNTER — PATIENT MESSAGE (OUTPATIENT)
Dept: FAMILY MEDICINE CLINIC | Facility: CLINIC | Age: 82
End: 2022-06-15

## 2022-06-15 DIAGNOSIS — E78.5 HYPERLIPIDEMIA LDL GOAL <100: ICD-10-CM

## 2022-06-15 DIAGNOSIS — E11.9 TYPE 2 DIABETES MELLITUS WITHOUT COMPLICATION, WITHOUT LONG-TERM CURRENT USE OF INSULIN: ICD-10-CM

## 2022-06-15 RX ORDER — ATORVASTATIN CALCIUM 20 MG/1
20 TABLET, FILM COATED ORAL DAILY
Qty: 90 TABLET | Refills: 3 | Status: SHIPPED | OUTPATIENT
Start: 2022-06-15

## 2022-06-15 RX ORDER — GEMFIBROZIL 600 MG/1
600 TABLET, FILM COATED ORAL
Qty: 180 TABLET | Refills: 3 | Status: SHIPPED | OUTPATIENT
Start: 2022-06-15

## 2022-06-15 RX ORDER — METFORMIN HYDROCHLORIDE 500 MG/1
TABLET, EXTENDED RELEASE ORAL
Qty: 360 TABLET | Refills: 2 | Status: SHIPPED | OUTPATIENT
Start: 2022-06-15

## 2022-06-15 NOTE — TELEPHONE ENCOUNTER
From: Izabela Martinez  To: Ze Yost MD  Sent: 6/15/2022 12:05 AM EDT  Subject: Reorder Metformin please mail order    Please send a prescription for Metformin to my mail order pharmacy . I was told to take it both morning and now night. Thank you. Coleman Martinez

## 2022-07-20 ENCOUNTER — OFFICE VISIT (OUTPATIENT)
Dept: FAMILY MEDICINE CLINIC | Facility: CLINIC | Age: 82
End: 2022-07-20

## 2022-07-20 VITALS
HEIGHT: 72 IN | BODY MASS INDEX: 27.9 KG/M2 | SYSTOLIC BLOOD PRESSURE: 128 MMHG | OXYGEN SATURATION: 98 % | DIASTOLIC BLOOD PRESSURE: 84 MMHG | WEIGHT: 206 LBS | HEART RATE: 78 BPM | TEMPERATURE: 98 F

## 2022-07-20 DIAGNOSIS — E11.9 TYPE 2 DIABETES MELLITUS WITHOUT COMPLICATION, WITHOUT LONG-TERM CURRENT USE OF INSULIN: Primary | ICD-10-CM

## 2022-07-20 DIAGNOSIS — E78.5 HYPERLIPIDEMIA LDL GOAL <70: ICD-10-CM

## 2022-07-20 DIAGNOSIS — E78.1 ESSENTIAL HYPERTRIGLYCERIDEMIA: ICD-10-CM

## 2022-07-20 PROCEDURE — 99214 OFFICE O/P EST MOD 30 MIN: CPT | Performed by: INTERNAL MEDICINE

## 2022-07-20 NOTE — PROGRESS NOTES
Concha Martinez is a 81 y.o. male.     Chief Complaint   Patient presents with   • Diabetes       History of Present Illness   History of diabetes.  Patient states to have been compliant with medications.  Blood sugar monitoring - patient states has not been checked.  No episodes of hypoglycemia, nausea, vomiting, new rashes, syncope or other issues.  Denies any difficulties and taking metformin ER 500 mg 2 tabs BID.  Last A1c performed on 1/26/2022 of 7.8%     History of high cholesterol.  Currently, has been feeling well without any myalgias, muscle aches, weakness, numbness, chest pain, short of breath or other issues.  Currently, is adherent with medication regimen of atorvastatin 20 mg and gemfibrozil 600 mg BID and denies medication side effects. Is due for lab follow-up.     Follow-up for history of CAD.  Currently, has been feeling well and asymptomatic without any headaches, vision changes, cough, chest pain, shortness of breath, swelling, focal neurologic deficit, memory loss or syncope.  Has not been taking any medications.  Dr Rose discontinued the ramipril in the past secondary to hypotension and orthostasis that has now resolved.     The following portions of the patient's history were reviewed and updated as appropriate: allergies, current medications, past family history, past medical history, past social history, past surgical history and problem list.    Depression Screen:  PHQ-2/PHQ-9 Depression Screening 7/28/2021   Retired PHQ-9 Total Score 0   Retired Total Score 0       Past Medical History:   Diagnosis Date   • Allergic cough    • Anemia    • Arteriosclerosis of coronary artery    • Benign enlargement of prostate    • Benign essential hypertension    • Blister of foot, left    • BMI 30.0-30.9,adult    • CAD (coronary artery disease) 6/11/2019   • Colon polyp 6/21   • Cough    • Diabetes mellitus (HCC) 6/11/2019   • Disorder of skin    • Essential hypertension 6/11/2019     Benign   • Essential hypertriglyceridemia    • H/O echocardiogram 6/11/2019    3/21/14 - Mild concentric LVG.  EF 55-60%.  Mild MR.  Moderate NV.     • H/O heart artery stent 6/11/2019    3/20/14 - PCI to the SVG of the RCA with a RESOLUTE drug eluting stent   • Hammer toe     right second-fourth toes    • History of cancer    • History of cardiac cath 6/11/2019 4/17/17 - Severe three vessel obstructive CAD.  Severely diseased vein grafts (SVG to ?OM, and SVG to RCA) that are the source of the patient's unstable angina..  EF 65%.   • History of diagnostic ultrasound 6/11/2019 4/18/17 - CAROTID ULTRASOUND - No evidence of significant carotid stenosis.  NASCET criteria was utilized.   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 polyps repeat every 3 years   • CORONARY ARTERY BYPASS GRAFT      4 vessel 4/21/17. 4 vessel- Left internal mammary to LAD, saphenous vein graft to diagonal branch, marginal brance and the PDA branch.    • CORONARY STENT PLACEMENT     • FOOT SURGERY      right heel   • FRACTURE SURGERY  2008   • OTHER SURGICAL HISTORY      Cath stent placement    • RETINAL DETACHMENT REPAIR         Family History   Problem Relation Age of Onset   • Cancer Mother    • Pancreatic cancer Mother    •  Alcohol abuse Mother    • Diabetes Brother    • Heart disease Brother    • Alcohol abuse Brother    • Heart disease Maternal Grandmother    • Heart disease Paternal Grandfather    • Alcohol abuse Father    • Heart disease Maternal Grandfather    • Stroke Paternal Grandmother        Social History     Socioeconomic History   • Marital status:    Tobacco Use   • Smoking status: Never Smoker   • Smokeless tobacco: Never Used   Substance and Sexual Activity   • Alcohol use: Not Currently   • Drug use: Never   • Sexual activity: Not Currently     Partners: Female     Birth control/protection: None       Current Outpatient Medications   Medication Sig Dispense Refill   • aspirin 81 MG EC tablet Take 81 mg by mouth Daily.     • atorvastatin (LIPITOR) 20 MG tablet Take 1 tablet by mouth Daily. 90 tablet 3   • gemfibrozil (LOPID) 600 MG tablet Take 1 tablet by mouth 2 (Two) Times a Day Before Meals. 180 tablet 3   • metFORMIN ER (GLUCOPHAGE-XR) 500 MG 24 hr tablet Take 2 tablets twice daily with meal 360 tablet 2   • Multiple Vitamin (MULTI-VITAMIN DAILY PO) Take  by mouth.     • nitroglycerin (NITROSTAT) 0.4 MG SL tablet Place 1 tablet under the tongue Every 5 (Five) Minutes As Needed for Chest Pain. Take no more than 3 doses in 15 minutes. 25 tablet 3   • rivaroxaban (XARELTO) 20 MG tablet Take 1 tablet by mouth Daily. 90 tablet 3   • vitamin C (ASCORBIC ACID) 500 MG tablet Take 500 mg by mouth Daily.     • vitamin E 400 UNIT capsule Take 400 Units by mouth Daily.       No current facility-administered medications for this visit.       Review of Systems   Constitutional: Negative for activity change, appetite change, fatigue, fever, unexpected weight gain and unexpected weight loss.   HENT: Negative for nosebleeds, rhinorrhea, trouble swallowing and voice change.    Eyes: Negative for visual disturbance.   Respiratory: Negative for cough, chest tightness, shortness of breath and wheezing.    Cardiovascular: Negative  "for chest pain, palpitations and leg swelling.   Gastrointestinal: Negative for abdominal pain, blood in stool, constipation, diarrhea, nausea, vomiting, GERD and indigestion.   Genitourinary: Negative for dysuria, frequency and hematuria.   Musculoskeletal: Negative for arthralgias, back pain and myalgias.   Skin: Negative for rash and wound.   Neurological: Negative for dizziness, tremors, weakness, light-headedness, numbness, headache and memory problem.   Hematological: Negative for adenopathy. Does not bruise/bleed easily.   Psychiatric/Behavioral: Negative for sleep disturbance and depressed mood. The patient is not nervous/anxious.        Objective   /84 (BP Location: Left arm, Patient Position: Sitting, Cuff Size: Large Adult)   Pulse 78   Temp 98 °F (36.7 °C) (Temporal)   Ht 182.9 cm (72.01\")   Wt 93.4 kg (206 lb)   SpO2 98%   BMI 27.93 kg/m²     Physical Exam  Vitals and nursing note reviewed.   Constitutional:       General: He is not in acute distress.     Appearance: He is well-developed. He is not diaphoretic.   HENT:      Head: Normocephalic and atraumatic.      Right Ear: External ear normal.      Left Ear: External ear normal.      Nose: Nose normal.   Eyes:      Conjunctiva/sclera: Conjunctivae normal.      Pupils: Pupils are equal, round, and reactive to light.   Neck:      Thyroid: No thyromegaly.      Trachea: No tracheal deviation.   Cardiovascular:      Rate and Rhythm: Normal rate and regular rhythm.      Heart sounds: Normal heart sounds. No murmur heard.    No friction rub. No gallop.   Pulmonary:      Effort: Pulmonary effort is normal. No respiratory distress.      Breath sounds: Normal breath sounds.   Abdominal:      General: Bowel sounds are normal.      Palpations: Abdomen is soft. There is no mass.      Tenderness: There is no abdominal tenderness. There is no guarding.   Musculoskeletal:         General: Normal range of motion.      Cervical back: Normal range of motion " and neck supple.   Lymphadenopathy:      Cervical: No cervical adenopathy.   Skin:     General: Skin is warm and dry.      Capillary Refill: Capillary refill takes less than 2 seconds.      Findings: No rash.   Neurological:      Mental Status: He is alert and oriented to person, place, and time.      Motor: No abnormal muscle tone.      Deep Tendon Reflexes: Reflexes normal.   Psychiatric:         Behavior: Behavior normal.         Thought Content: Thought content normal.         Judgment: Judgment normal.         Recent Results (from the past 2016 hour(s))   Adult Transthoracic Echo Complete W/ Color, Spectral and Contrast if Necessary Per Protocol    Collection Time: 05/31/22  8:09 AM   Result Value Ref Range    Target HR (85%) 117 bpm    Max. Pred. HR (100%) 138 bpm    RV S' 7.6 cm/sec    RV Base 3.2 cm    RV Length 7.5 cm    RV Mid 3.1 cm    Sinus 3.3 cm    STJ 2.6 cm    LA ESV Index (BP) 35.5 ml/m2    Avg E/e' ratio 4.64     LV GLOBAL STRAIN  -16.9 %    Ao pk jayro 149.3 cm/sec    Ao V2 VTI 32.6 cm    CASH(I,D) 2.19 cm2    EDV(cubed) 77.9 ml    EDV(MOD-sp2) 100.0 ml    EDV(MOD-sp4) 117.0 ml    EF(MOD-bp) 54.2 %    EF(MOD-sp2) 48.0 %    EF(MOD-sp4) 56.4 %    EF_3D-VOL 50.0 %    ESV(cubed) 30.1 ml    ESV(MOD-sp2) 52.0 ml    ESV(MOD-sp4) 51.0 ml    IVS/LVPW 1.03 cm    Lat Peak E' Jayro 11.5 cm/sec    LV mass(C)d 176.6 grams    LV V1 max PG 2.8 mmHg    LV V1 mean PG 1.40 mmHg    LV V1 max 83.1 cm/sec    LVPWd 1.15 cm    Med Peak E' Jayro 7.5 cm/sec    MR max PG 63.2 mmHg    MV dec slope 133.7 cm/sec2    MV dec time 222 msec    MV P1/2t 115.3 msec    MV V2 VTI 20.9 cm    MVA(VTI) 3.4 cm2    PA acc time 0.13 sec    PA pr(Accel) 18.4 mmHg    PA V2 max 90.2 cm/sec    PI end-d jayro 115.4 cm/sec    RAP systole 3.0 mmHg    RV V1 max PG 1.09 mmHg    RV V1 max 52.3 cm/sec    RV V1 VTI 12.1 cm    RVSP(TR) 22.0 mmHg    SI(MOD-sp2) 22.4 ml/m2    SI(MOD-sp4) 30.8 ml/m2    SV(LVOT) 71.5 ml    SV(MOD-sp2) 48.0 ml    SV(MOD-sp4) 66.0 ml     SV(RVOT) 87.4 ml    TR max PG 19.0 mmHg    Ao max PG 8.9 mmHg    Ao mean PG 4.1 mmHg    FS 27.1 %    IVSd 1.19 cm    LV V1 VTI 17.3 cm    LVIDd 4.3 cm    LVIDs 3.1 cm    LVOT area 4.1 cm2    LVOT diam 2.29 cm    MV E/A 0.83     MV max PG 1.18 mmHg    MV mean PG 0.55 mmHg    MVA(P1/2t) 1.91 cm2    Qp/Qs 1.22     RVOT diam 3.0 cm    MR max tangela 397.6 cm/sec    MV A dur 0.16 sec    MV A max tangela 53.1 cm/sec    MV E max tangela 44.1 cm/sec    TR max tangela 218.0 cm/sec    LV Matthew Vol (BSA corrected) 54.7 cm2    LV Sys Vol (BSA corrected) 23.8 cm2    TAPSE (>1.6) 1.50 cm     Assessment & Plan   Diagnoses and all orders for this visit:    1. Type 2 diabetes mellitus without complication, without long-term current use of insulin (HCC) (Primary)  -     Hemoglobin A1c  -     Comprehensive Metabolic Panel  -     Lipid Panel    2. Hyperlipidemia LDL goal <70  -     Comprehensive Metabolic Panel  -     Lipid Panel    3. Essential hypertriglyceridemia  -     Comprehensive Metabolic Panel  -     Lipid Panel    Uncontrolled type 2 diabetes.  We will check A1c today and will evaluate for need for adjustment of the medication.  With any luck the metformin increase has significantly lowered his blood sugar so we do not need to add any other medication.  His cholesterol will also be checked and adjust his of atorvastatin as needed.           · COVID-19 Precautions - Patient was compliant in wearing a mask. When I saw the patient, I used appropriate personal protective equipment (PPE) including mask and eye shield (standard procedure).  Additionally, I used gown and gloves if indicated.  Hand hygiene was completed before and after seeing the patient.  · Dictated utilizing Dragon Dictation

## 2022-07-21 LAB
ALBUMIN SERPL-MCNC: 5 G/DL (ref 3.6–4.6)
ALBUMIN/GLOB SERPL: 2.6 {RATIO} (ref 1.2–2.2)
ALP SERPL-CCNC: 104 IU/L (ref 44–121)
ALT SERPL-CCNC: 22 IU/L (ref 0–44)
AST SERPL-CCNC: 25 IU/L (ref 0–40)
BILIRUB SERPL-MCNC: 0.6 MG/DL (ref 0–1.2)
BUN SERPL-MCNC: 28 MG/DL (ref 8–27)
BUN/CREAT SERPL: 24 (ref 10–24)
CALCIUM SERPL-MCNC: 9.6 MG/DL (ref 8.6–10.2)
CHLORIDE SERPL-SCNC: 105 MMOL/L (ref 96–106)
CHOLEST SERPL-MCNC: 129 MG/DL (ref 100–199)
CO2 SERPL-SCNC: 22 MMOL/L (ref 20–29)
CREAT SERPL-MCNC: 1.19 MG/DL (ref 0.76–1.27)
EGFRCR SERPLBLD CKD-EPI 2021: 61 ML/MIN/1.73
GLOBULIN SER CALC-MCNC: 1.9 G/DL (ref 1.5–4.5)
GLUCOSE SERPL-MCNC: 143 MG/DL (ref 65–99)
HBA1C MFR BLD: 7.5 % (ref 4.8–5.6)
HDLC SERPL-MCNC: 39 MG/DL
LDLC SERPL CALC-MCNC: 72 MG/DL (ref 0–99)
POTASSIUM SERPL-SCNC: 5 MMOL/L (ref 3.5–5.2)
PROT SERPL-MCNC: 6.9 G/DL (ref 6–8.5)
SODIUM SERPL-SCNC: 142 MMOL/L (ref 134–144)
TRIGL SERPL-MCNC: 97 MG/DL (ref 0–149)
VLDLC SERPL CALC-MCNC: 18 MG/DL (ref 5–40)

## 2022-10-05 ENCOUNTER — OFFICE VISIT (OUTPATIENT)
Dept: CARDIOLOGY | Facility: CLINIC | Age: 82
End: 2022-10-05

## 2022-10-05 ENCOUNTER — IMMUNIZATION (OUTPATIENT)
Dept: VACCINE CLINIC | Facility: HOSPITAL | Age: 82
End: 2022-10-05

## 2022-10-05 VITALS
HEART RATE: 60 BPM | WEIGHT: 206 LBS | DIASTOLIC BLOOD PRESSURE: 82 MMHG | SYSTOLIC BLOOD PRESSURE: 130 MMHG | HEIGHT: 72 IN | BODY MASS INDEX: 27.9 KG/M2

## 2022-10-05 DIAGNOSIS — I10 ESSENTIAL HYPERTENSION: ICD-10-CM

## 2022-10-05 DIAGNOSIS — E78.5 HYPERLIPIDEMIA LDL GOAL <70: ICD-10-CM

## 2022-10-05 DIAGNOSIS — E11.9 TYPE 2 DIABETES MELLITUS WITHOUT COMPLICATION, WITHOUT LONG-TERM CURRENT USE OF INSULIN: ICD-10-CM

## 2022-10-05 DIAGNOSIS — Z23 NEED FOR VACCINATION: Primary | ICD-10-CM

## 2022-10-05 DIAGNOSIS — I25.10 CORONARY ARTERY DISEASE INVOLVING NATIVE CORONARY ARTERY OF NATIVE HEART WITHOUT ANGINA PECTORIS: Primary | ICD-10-CM

## 2022-10-05 DIAGNOSIS — Z95.1 S/P CABG (CORONARY ARTERY BYPASS GRAFT): ICD-10-CM

## 2022-10-05 DIAGNOSIS — Z95.5 H/O HEART ARTERY STENT: ICD-10-CM

## 2022-10-05 PROBLEM — I07.1 TRICUSPID VALVE INSUFFICIENCY: Status: RESOLVED | Noted: 2022-04-15 | Resolved: 2022-10-05

## 2022-10-05 PROBLEM — I27.20 MODERATE PULMONARY HYPERTENSION (HCC): Status: RESOLVED | Noted: 2022-04-15 | Resolved: 2022-10-05

## 2022-10-05 PROCEDURE — 91312 HC SARSCOV2 VAC 30MCG/0.3ML IM BIVALENT BOOSTER 12 YRS AND OLDER: CPT | Performed by: INTERNAL MEDICINE

## 2022-10-05 PROCEDURE — 99214 OFFICE O/P EST MOD 30 MIN: CPT | Performed by: INTERNAL MEDICINE

## 2022-10-05 PROCEDURE — 0124A: CPT | Performed by: INTERNAL MEDICINE

## 2022-10-05 NOTE — PROGRESS NOTES
"Chief Complaint  No chief complaint on file.    Subjective    History of Present Illness      I saw Zeenat Martinez today for cardiovascular care.  He is a very pleasant and active 82-year-old male accompanied by his wife.  Teddyy feels well and denies any chest pain pressure or tightness.  His respiratory status is stable.  He does not report orthopnea, PND no lower extremity edema.  He is free of syncope near syncope or any significant palpitations.  His blood pressure is well controlled.  His weight is stable and his BMI is 27.94 he continues to tolerate his medical regimen well he does not report any significant side effects.  He is maintained on atorvastatin 20 mg daily for lipid control.  He is on long-term anticoagulation which was initiated while in [place] secondary to intracoronary thrombus.Nuclear stress test 4/19/2021 revealed left ventricular ejection fraction 46%, small apical infarction no evidence of ischemia.  Echocardiogram 5/31/2022 revealed left ventricular ejection fraction 54.2 %, trace aortic regurgitation, right ventricular systolic pressure less than 35 mmHg.    Objective   Vital Signs:   /82   Pulse 60   Ht 182.9 cm (72\")   Wt 93.4 kg (206 lb)   BMI 27.94 kg/m²     Constitutional:       Appearance: Well-developed.   Eyes:      Conjunctiva/sclera: Conjunctivae normal.      Pupils: Pupils are equal, round, and reactive to light.   HENT:      Head: Normocephalic and atraumatic.   Neck:      Thyroid: No thyromegaly.   Pulmonary:      Effort: Pulmonary effort is normal. No respiratory distress.      Breath sounds: Normal breath sounds. No wheezing. No rales.   Cardiovascular:      Normal rate. Regular rhythm.      S4 Gallop. No S3 gallop. No rub.   Edema:     Peripheral edema absent.   Abdominal:      General: Bowel sounds are normal. There is no distension.      Palpations: Abdomen is soft. There is no abdominal mass.      Tenderness: There is no abdominal tenderness. "   Musculoskeletal: Normal range of motion.      Cervical back: Neck supple. Skin:     General: Skin is warm and dry.      Findings: No erythema.   Neurological:      Mental Status: Alert and oriented to person, place, and time.         Result Review :     Common labs    Common Labs 1/26/22 1/26/22 1/26/22 7/20/22 7/20/22 7/20/22    0836 0836 0836 0912 0912 0912   Glucose  136 (A)   143 (A)    BUN  22   28 (A)    Creatinine  1.01   1.19    eGFR Non  Am  69       eGFR African Am  80       Sodium  143   142    Potassium  4.5   5.0    Chloride  106   105    Calcium  9.0   9.6    Total Protein  6.6   6.9    Albumin  4.1   5.0 (A)    Total Bilirubin  0.5   0.6    Alkaline Phosphatase  100   104    AST (SGOT)  22   25    ALT (SGPT)  20   22    Total Cholesterol   128   129   Triglycerides   81   97   HDL Cholesterol   39 (A)   39 (A)   LDL Cholesterol    73   72   Hemoglobin A1C 7.8 (A)   7.5 (A)     (A) Abnormal value       Comments are available for some flowsheets but are not being displayed.                     Assessment and Plan    1. Coronary artery disease involving native coronary artery of native heart without angina pectoris  Stable    2. S/P CABG (coronary artery bypass graft)  Stable    3. H/O heart artery stent  Stable    4. Essential hypertension  Controlled    5. Hyperlipidemia LDL goal <70  Controlled    6. Type 2 diabetes mellitus without complication, without long-term current use of insulin (HCC)  Stable    Diptizzy feels well and remains active.  He does not report symptoms of angina or respiratory insufficiency.  He is euvolemic on examination.  His blood pressure and lipids are controlled and he tolerates his medications well.  I will arrange for follow-up in 6 months sooner if needed.        Follow Up   No follow-ups on file.  Patient was given instructions and counseling regarding his condition or for health maintenance advice. Please see specific information pulled into the AVS if appropriate.

## 2022-11-22 ENCOUNTER — TELEPHONE (OUTPATIENT)
Dept: FAMILY MEDICINE CLINIC | Facility: CLINIC | Age: 82
End: 2022-11-22

## 2022-11-22 DIAGNOSIS — Z20.828 EXPOSURE TO THE FLU: Primary | ICD-10-CM

## 2022-11-22 DIAGNOSIS — Z20.828 EXPOSURE TO THE FLU: ICD-10-CM

## 2022-11-22 RX ORDER — OSELTAMIVIR PHOSPHATE 75 MG/1
75 CAPSULE ORAL DAILY
Qty: 10 CAPSULE | Refills: 0 | Status: SHIPPED | OUTPATIENT
Start: 2022-11-22

## 2022-11-22 RX ORDER — OSELTAMIVIR PHOSPHATE 75 MG/1
75 CAPSULE ORAL DAILY
Qty: 7 CAPSULE | Refills: 0 | Status: SHIPPED | OUTPATIENT
Start: 2022-11-22 | End: 2022-11-22 | Stop reason: SDUPTHER

## 2022-11-22 NOTE — TELEPHONE ENCOUNTER
I called Pharmacy and was told Tamiflu x prevention is once a day x 10 days, then another Pharmacist told me always been twice a day x 5 days x prevention,, unable to give once a day x 7 days like it says on [facility] and [facility] so 10 days once a day sent

## 2022-11-22 NOTE — TELEPHONE ENCOUNTER
Caller: Edith Martinez    Relationship: Emergency Contact    What medication are you requesting: PREVENTATIVE    What are your current symptoms: NONE    How long have you been experiencing symptoms: N/A    Have you had these symptoms before:    [] Yes  [x] No    Have you been treated for these symptoms before:   [] Yes  [x] No    If a prescription is needed, what is your preferred pharmacy and phone number: [facility] [id number] - [place] [address] AT [facility] & [address] [facility] [phone] [facility] [phone] FX     Additional notes: PATIENT HAS A HOUSE GUEST THAT HAS TESTED POSITIVE FOR TYPE A FLU. DUE TO UNDERLYING HEALTH CONDITIONS, THE PATIENT WOULD LIKE ANY PREVENTATIVE MEDICATION THAT DR. METCALF MIGHT BE ABLE TO OFFER.

## 2022-11-29 ENCOUNTER — TELEPHONE (OUTPATIENT)
Dept: FAMILY MEDICINE CLINIC | Facility: CLINIC | Age: 82
End: 2022-11-29

## 2022-11-29 NOTE — TELEPHONE ENCOUNTER
Caller: Edith Martinez    Relationship: Self    Best call back number: [phone]    PATIENT'S WIFE CALLED STATING HE WAS RECENTLY PRESCRIBED TAMAFLU.  THIS COMING Thursday IS HIS LAST DOSE OF THAT.  THIS WAS PRESCRIBED AS A PREVENTATIVE MEASURE DUE TO A HOUSEGUEST RECENTLY HAVING THE FLU AND EXPOSING HIM.    HE IS LEAVING FOR [place] TOMORROW 11/30/22 AND HIS GRANDCHILD HAS THE FLU CURRENTLY THERE IN [place]. THEY WILL BE GONE 6 DAYS.    HE IS WONDERING IF THERE IS ANY MEDICATIONS DR METCALF CAN CALL IN FOR HIM TODAY AS PREVENTATIVE MEASURES IN CASE \HE GETS SICK WHILE HE IS THERE VISITING.    HE MENTIONED TESSALON COUGH PERLES AND ANYTHING ELSE THAT DR METCALF WOULD FIND FIT.      [facility] [id number] - [place], [place] - [facility] [address] AT [facility] [address] - [phone]  - [phone]   [phone]      PLEASE GIVE PATIENT A CALLBACK BEFORE END OF DAY TODAY PLEASE

## 2022-11-29 NOTE — TELEPHONE ENCOUNTER
Rx Refill Note  Requested Prescriptions      No prescriptions requested or ordered in this encounter      Last office visit with prescribing clinician: 7/20/2022   Last telemedicine visit with prescribing clinician: Visit date not found   Next office visit with prescribing clinician: Visit date not found

## 2022-12-21 ENCOUNTER — TELEPHONE (OUTPATIENT)
Dept: FAMILY MEDICINE CLINIC | Facility: CLINIC | Age: 82
End: 2022-12-21

## 2022-12-21 NOTE — TELEPHONE ENCOUNTER
Caller: [place]Edith    Relationship: Emergency Contact    Best call back number: [phone]  What medication are you requesting: ANTIBIOTIC     What are your current symptoms: CONGESTION, COUGH, SINUS PRESSURE, MAKING HIS TEETH ACHE     How long have you been experiencing symptoms: OVER 10 DAYS    Have you had these symptoms before:    [x] Yes  [] No    Have you been treated for these symptoms before:   [x] Yes  [] No    If a prescription is needed, what is your preferred pharmacy and phone number: [facility] [id number] - [place] [address] [address] [facility] [address]  - [phone]  - [phone] FX

## 2022-12-21 NOTE — TELEPHONE ENCOUNTER
Informed patient he would have to make an appointment to be seen or go to UC, patient refused to schedule appointment.

## 2023-03-03 ENCOUNTER — TRANSCRIBE ORDERS (OUTPATIENT)
Dept: ADMINISTRATIVE | Facility: HOSPITAL | Age: 83
End: 2023-03-03
Payer: MEDICARE

## 2023-03-03 ENCOUNTER — HOSPITAL ENCOUNTER (OUTPATIENT)
Dept: CT IMAGING | Facility: HOSPITAL | Age: 83
Discharge: HOME | End: 2023-03-03
Admitting: INTERNAL MEDICINE
Payer: MEDICARE

## 2023-03-03 DIAGNOSIS — R10.84 ABDOMINAL PAIN, GENERALIZED: ICD-10-CM

## 2023-03-03 DIAGNOSIS — R10.84 ABDOMINAL PAIN, GENERALIZED: Primary | ICD-10-CM

## 2023-03-03 LAB — CREAT BLDA-MCNC: 0.9 MG/DL (ref 0.6–1.3)

## 2023-03-03 PROCEDURE — 74177 CT ABD & PELVIS W/CONTRAST: CPT

## 2023-03-03 PROCEDURE — 82565 ASSAY OF CREATININE: CPT

## 2023-03-03 PROCEDURE — 25510000001 IOPAMIDOL 61 % SOLUTION: Performed by: INTERNAL MEDICINE

## 2023-03-03 RX ADMIN — IOPAMIDOL 85 ML: 612 INJECTION, SOLUTION INTRAVENOUS at 15:23

## 2023-03-03 NOTE — NURSING NOTE
1527 Patient here for STAT hold and call .    1605 Call from Dr. Smith , patient may go home,  will call patient.     1611  Patient ambulated independently to entrance A.  Protective goggles and mask in place with all patient interactions today

## 2024-01-24 ENCOUNTER — TRANSCRIBE ORDERS (OUTPATIENT)
Dept: HOME HEALTH SERVICES | Facility: HOME HEALTHCARE | Age: 84
End: 2024-01-24
Payer: MEDICARE

## 2024-01-24 DIAGNOSIS — C25.9 MALIGNANT NEOPLASM OF PANCREAS, UNSPECIFIED LOCATION OF MALIGNANCY: Primary | ICD-10-CM

## 2024-01-25 ENCOUNTER — HOME HEALTH ADMISSION (OUTPATIENT)
Dept: HOME HEALTH SERVICES | Facility: HOME HEALTHCARE | Age: 84
End: 2024-01-25
Payer: MEDICARE

## 2024-01-27 ENCOUNTER — HOME CARE VISIT (OUTPATIENT)
Dept: HOME HEALTH SERVICES | Facility: HOME HEALTHCARE | Age: 84
End: 2024-01-27
Payer: MEDICARE

## 2024-01-27 NOTE — Clinical Note
Hello, I am verifying that you will be signing off on Home Health skilled nursing orders.   Thank you   Stacy RIOS   [phone]

## 2024-01-27 NOTE — HOME HEALTH
"SOC Note:  83 year old ALOX, dx with pancreatic cancer May of 2023, treatment completed at [facility], with Oncology providers at [facility], and local provided in [place] for home health orders. Patient is motivated to get stronger, issues with appitite, reviewed with patient way to increase appitie with small frequent meals.   I have attempted to drain plurex drain due to patient request and family, 6 days since last time and over 3L drained, today patient had 10cc drained and stop due to pain.   Lungs clear bilat prior and post plurex cath drained.   Verbal orders from PCP for CBC, CMP, and 2L IV Normal Saline for 1/29/24    Home Health ordered for: disciplines SN     Reason for Hosp/Primary Dx/Co-morbidities: Pancrace cancer, with Plurex Drain     Focus of Care: Plurex Drain     Patient's goal(s): \"to make it to the 74 year of [employer] race in May\"    Current Functional status/mobility/DME: shower chair, blood pressure cuff, 02 prob     HB status/Living Arrangements: lives at home with wife    Skin Integrity/wound status: Plurex drain     Code Status: FULL CODE     Fall Risk/Safety concerns: low endurance and weakness with fatigue, reports he is sleeping more than he was prior to radiation     Plan for next visit: CP assessment, Plurex site care if soiled, labs and IV fluids"

## 2024-01-29 ENCOUNTER — LAB REQUISITION (OUTPATIENT)
Dept: LAB | Facility: HOSPITAL | Age: 84
End: 2024-01-29
Payer: MEDICARE

## 2024-01-29 ENCOUNTER — HOME CARE VISIT (OUTPATIENT)
Dept: HOME HEALTH SERVICES | Facility: HOME HEALTHCARE | Age: 84
End: 2024-01-29
Payer: MEDICARE

## 2024-01-29 VITALS
OXYGEN SATURATION: 94 % | DIASTOLIC BLOOD PRESSURE: 52 MMHG | WEIGHT: 150 LBS | RESPIRATION RATE: 18 BRPM | HEART RATE: 94 BPM | TEMPERATURE: 97.9 F | BODY MASS INDEX: 20.34 KG/M2 | SYSTOLIC BLOOD PRESSURE: 90 MMHG

## 2024-01-29 VITALS
OXYGEN SATURATION: 94 % | HEART RATE: 51 BPM | RESPIRATION RATE: 18 BRPM | SYSTOLIC BLOOD PRESSURE: 90 MMHG | TEMPERATURE: 98.3 F | DIASTOLIC BLOOD PRESSURE: 52 MMHG

## 2024-01-29 DIAGNOSIS — C25.9 MALIGNANT NEOPLASM OF PANCREAS, UNSPECIFIED: ICD-10-CM

## 2024-01-29 LAB
ALBUMIN SERPL-MCNC: 2.3 G/DL (ref 3.5–5.2)
ALBUMIN/GLOB SERPL: 0.9 G/DL
ALP SERPL-CCNC: 233 U/L (ref 39–117)
ALT SERPL W P-5'-P-CCNC: 19 U/L (ref 1–41)
ANION GAP SERPL CALCULATED.3IONS-SCNC: 11.8 MMOL/L (ref 5–15)
AST SERPL-CCNC: 27 U/L (ref 1–40)
BASOPHILS # BLD AUTO: 0.01 10*3/MM3 (ref 0–0.2)
BASOPHILS NFR BLD AUTO: 0.3 % (ref 0–1.5)
BILIRUB SERPL-MCNC: 0.3 MG/DL (ref 0–1.2)
BUN SERPL-MCNC: 13 MG/DL (ref 8–23)
BUN/CREAT SERPL: 19.7 (ref 7–25)
CALCIUM SPEC-SCNC: 8 MG/DL (ref 8.6–10.5)
CHLORIDE SERPL-SCNC: 105 MMOL/L (ref 98–107)
CO2 SERPL-SCNC: 22.2 MMOL/L (ref 22–29)
CREAT SERPL-MCNC: 0.66 MG/DL (ref 0.76–1.27)
DEPRECATED RDW RBC AUTO: 53.9 FL (ref 37–54)
EGFRCR SERPLBLD CKD-EPI 2021: 92.5 ML/MIN/1.73
EOSINOPHIL # BLD AUTO: 0.03 10*3/MM3 (ref 0–0.4)
EOSINOPHIL NFR BLD AUTO: 0.8 % (ref 0.3–6.2)
ERYTHROCYTE [DISTWIDTH] IN BLOOD BY AUTOMATED COUNT: 15.7 % (ref 12.3–15.4)
GLOBULIN UR ELPH-MCNC: 2.5 GM/DL
GLUCOSE SERPL-MCNC: 124 MG/DL (ref 65–99)
HCT VFR BLD AUTO: 34.1 % (ref 37.5–51)
HGB BLD-MCNC: 10.9 G/DL (ref 13–17.7)
LYMPHOCYTES # BLD AUTO: 1.1 10*3/MM3 (ref 0.7–3.1)
LYMPHOCYTES NFR BLD AUTO: 27.8 % (ref 19.6–45.3)
MCH RBC QN AUTO: 30.7 PG (ref 26.6–33)
MCHC RBC AUTO-ENTMCNC: 32 G/DL (ref 31.5–35.7)
MCV RBC AUTO: 96.1 FL (ref 79–97)
MONOCYTES # BLD AUTO: 0.38 10*3/MM3 (ref 0.1–0.9)
MONOCYTES NFR BLD AUTO: 9.6 % (ref 5–12)
NEUTROPHILS NFR BLD AUTO: 2.43 10*3/MM3 (ref 1.7–7)
NEUTROPHILS NFR BLD AUTO: 61.2 % (ref 42.7–76)
PLATELET # BLD AUTO: 139 10*3/MM3 (ref 140–450)
PMV BLD AUTO: 11.5 FL (ref 6–12)
POTASSIUM SERPL-SCNC: 3.9 MMOL/L (ref 3.5–5.2)
PROT SERPL-MCNC: 4.8 G/DL (ref 6–8.5)
RBC # BLD AUTO: 3.55 10*6/MM3 (ref 4.14–5.8)
SODIUM SERPL-SCNC: 139 MMOL/L (ref 136–145)
WBC NRBC COR # BLD AUTO: 3.96 10*3/MM3 (ref 3.4–10.8)

## 2024-01-29 PROCEDURE — G0299 HHS/HOSPICE OF RN EA 15 MIN: HCPCS

## 2024-01-29 PROCEDURE — 80053 COMPREHEN METABOLIC PANEL: CPT | Performed by: INTERNAL MEDICINE

## 2024-01-29 PROCEDURE — 85025 COMPLETE CBC W/AUTO DIFF WBC: CPT | Performed by: INTERNAL MEDICINE

## 2024-01-30 NOTE — HOME HEALTH
Routine Visit Note:  pATIENT IS AOX4, WEAKNESS INCREASE SINCE YESTERDAY, DECREASE IN MOBILITY IN THE HOME, IV FLUIDS GIVEN FOR PORT ACCESS AND BLOOD DRAW.     Edema in abd and left leg increase after fluids, patient and family educated on support stockings.

## 2024-01-31 ENCOUNTER — HOME CARE VISIT (OUTPATIENT)
Dept: HOME HEALTH SERVICES | Facility: HOME HEALTHCARE | Age: 84
End: 2024-01-31
Payer: MEDICARE

## 2024-01-31 VITALS
RESPIRATION RATE: 18 BRPM | TEMPERATURE: 98 F | DIASTOLIC BLOOD PRESSURE: 60 MMHG | SYSTOLIC BLOOD PRESSURE: 90 MMHG | HEART RATE: 78 BPM

## 2024-01-31 PROCEDURE — G0299 HHS/HOSPICE OF RN EA 15 MIN: HCPCS

## 2024-01-31 NOTE — Clinical Note
I have drained 750ml out of Plurex drain today, he does not report much relief in draining. [facility] in the home when I left after todays visit. Waiting to hear from wife to see what he has decided.  Thank you   Stacy RIOS [phone]

## 2024-02-01 NOTE — HOME HEALTH
Routine Visit Note:  [facility] arrived after my visit as I have talked with patient the differences with home health and [facility]. Patient is talking to admission RN to discuss there options as he is wanting to go Hospice as they can provide increase in pain medications and can drain plurex when needed. I will talk to family and patient on Friday and discuss if we are discharging patient on Friday.     Skill/education provided: PLUREX DRAIN 750ml with dressing change completed due to dressing soiled.     Plan for next visit: DC patient if they decide to go with [facility]

## 2024-02-02 ENCOUNTER — HOME CARE VISIT (OUTPATIENT)
Dept: HOME HEALTH SERVICES | Facility: HOME HEALTHCARE | Age: 84
End: 2024-02-02
Payer: MEDICARE